# Patient Record
Sex: MALE | Race: WHITE | NOT HISPANIC OR LATINO
[De-identification: names, ages, dates, MRNs, and addresses within clinical notes are randomized per-mention and may not be internally consistent; named-entity substitution may affect disease eponyms.]

---

## 2017-07-10 ENCOUNTER — OTHER (OUTPATIENT)
Age: 62
End: 2017-07-10

## 2017-07-20 ENCOUNTER — LABORATORY RESULT (OUTPATIENT)
Age: 62
End: 2017-07-20

## 2017-07-20 LAB
ALBUMIN SERPL ELPH-MCNC: 4.3 G/DL
ALP BLD-CCNC: 72 U/L
ALT SERPL-CCNC: 31 U/L
ANION GAP SERPL CALC-SCNC: 15 MMOL/L
APPEARANCE: CLEAR
AST SERPL-CCNC: 20 U/L
BACTERIA: NEGATIVE
BASOPHILS # BLD AUTO: 0 K/UL
BASOPHILS NFR BLD AUTO: 0 %
BILIRUB SERPL-MCNC: 0.4 MG/DL
BILIRUBIN URINE: NEGATIVE
BLOOD URINE: NEGATIVE
BUN SERPL-MCNC: 23 MG/DL
CALCIUM SERPL-MCNC: 9.5 MG/DL
CHLORIDE SERPL-SCNC: 103 MMOL/L
CHOLEST SERPL-MCNC: 180 MG/DL
CHOLEST/HDLC SERPL: 5.3 RATIO
CO2 SERPL-SCNC: 23 MMOL/L
COLOR: YELLOW
CREAT SERPL-MCNC: 0.9 MG/DL
EOSINOPHIL # BLD AUTO: 0.07 K/UL
EOSINOPHIL NFR BLD AUTO: 0.9 %
FOLATE SERPL-MCNC: 7.2 NG/ML
GGT SERPL-CCNC: 24 U/L
GLUCOSE QUALITATIVE U: NORMAL MG/DL
GLUCOSE SERPL-MCNC: 110 MG/DL
HBA1C MFR BLD HPLC: 5.4 %
HCT VFR BLD CALC: 37.8 %
HDLC SERPL-MCNC: 34 MG/DL
HGB BLD-MCNC: 11.8 G/DL
IRON SERPL-MCNC: 62 UG/DL
KETONES URINE: NEGATIVE
LDLC SERPL CALC-MCNC: 100 MG/DL
LEUKOCYTE ESTERASE URINE: NEGATIVE
LYMPHOCYTES # BLD AUTO: 1.1 K/UL
LYMPHOCYTES NFR BLD AUTO: 14.8 %
MAN DIFF?: NORMAL
MCHC RBC-ENTMCNC: 21.9 PG
MCHC RBC-ENTMCNC: 31.2 GM/DL
MCV RBC AUTO: 70.3 FL
MICROSCOPIC-UA: NORMAL
MONOCYTES # BLD AUTO: 0.19 K/UL
MONOCYTES NFR BLD AUTO: 2.6 %
NEUTROPHILS # BLD AUTO: 5.86 K/UL
NEUTROPHILS NFR BLD AUTO: 79.1 %
NITRITE URINE: NEGATIVE
PH URINE: 5.5
PLATELET # BLD AUTO: 200 K/UL
POTASSIUM SERPL-SCNC: 4.4 MMOL/L
PROT SERPL-MCNC: 7.4 G/DL
PROTEIN URINE: NEGATIVE MG/DL
PSA SERPL-MCNC: 0.42 NG/ML
RBC # BLD: 5.38 M/UL
RBC # FLD: 15.9 %
RED BLOOD CELLS URINE: 3 /HPF
SODIUM SERPL-SCNC: 141 MMOL/L
SPECIFIC GRAVITY URINE: 1.03
SQUAMOUS EPITHELIAL CELLS: 0 /HPF
TRIGL SERPL-MCNC: 228 MG/DL
TSH SERPL-ACNC: 2.7 UIU/ML
UROBILINOGEN URINE: NORMAL MG/DL
VIT B12 SERPL-MCNC: 349 PG/ML
WBC # FLD AUTO: 7.41 K/UL
WHITE BLOOD CELLS URINE: 0 /HPF

## 2017-07-21 LAB — 25(OH)D3 SERPL-MCNC: 32.4 NG/ML

## 2017-07-24 ENCOUNTER — APPOINTMENT (OUTPATIENT)
Dept: INTERNAL MEDICINE | Facility: CLINIC | Age: 62
End: 2017-07-24

## 2017-07-24 ENCOUNTER — NON-APPOINTMENT (OUTPATIENT)
Age: 62
End: 2017-07-24

## 2017-07-24 VITALS
WEIGHT: 231 LBS | SYSTOLIC BLOOD PRESSURE: 132 MMHG | TEMPERATURE: 98.1 F | HEART RATE: 98 BPM | BODY MASS INDEX: 30.62 KG/M2 | HEIGHT: 73 IN | RESPIRATION RATE: 16 BRPM | DIASTOLIC BLOOD PRESSURE: 80 MMHG | OXYGEN SATURATION: 97 %

## 2017-10-23 ENCOUNTER — APPOINTMENT (OUTPATIENT)
Dept: CT IMAGING | Facility: CLINIC | Age: 62
End: 2017-10-23

## 2017-11-01 ENCOUNTER — APPOINTMENT (OUTPATIENT)
Dept: INTERNAL MEDICINE | Facility: CLINIC | Age: 62
End: 2017-11-01
Payer: COMMERCIAL

## 2017-11-01 VITALS
OXYGEN SATURATION: 97 % | TEMPERATURE: 97.5 F | RESPIRATION RATE: 16 BRPM | BODY MASS INDEX: 30.48 KG/M2 | SYSTOLIC BLOOD PRESSURE: 130 MMHG | DIASTOLIC BLOOD PRESSURE: 74 MMHG | HEART RATE: 87 BPM | HEIGHT: 73 IN | WEIGHT: 230 LBS

## 2017-11-01 PROCEDURE — 99214 OFFICE O/P EST MOD 30 MIN: CPT

## 2017-12-07 ENCOUNTER — APPOINTMENT (OUTPATIENT)
Dept: INTERNAL MEDICINE | Facility: CLINIC | Age: 62
End: 2017-12-07
Payer: COMMERCIAL

## 2017-12-07 VITALS
DIASTOLIC BLOOD PRESSURE: 62 MMHG | BODY MASS INDEX: 30.09 KG/M2 | OXYGEN SATURATION: 98 % | TEMPERATURE: 97.8 F | SYSTOLIC BLOOD PRESSURE: 110 MMHG | HEIGHT: 73 IN | RESPIRATION RATE: 18 BRPM | WEIGHT: 227 LBS | HEART RATE: 90 BPM

## 2017-12-07 DIAGNOSIS — H26.9 UNSPECIFIED CATARACT: ICD-10-CM

## 2017-12-07 PROCEDURE — 99214 OFFICE O/P EST MOD 30 MIN: CPT

## 2018-07-30 ENCOUNTER — APPOINTMENT (OUTPATIENT)
Dept: INTERNAL MEDICINE | Facility: CLINIC | Age: 63
End: 2018-07-30

## 2018-10-28 ENCOUNTER — RESULT CHARGE (OUTPATIENT)
Age: 63
End: 2018-10-28

## 2018-10-28 ENCOUNTER — EMERGENCY (EMERGENCY)
Facility: HOSPITAL | Age: 63
LOS: 0 days | Discharge: HOME | End: 2018-10-28
Attending: EMERGENCY MEDICINE | Admitting: EMERGENCY MEDICINE
Payer: COMMERCIAL

## 2018-10-28 VITALS
HEART RATE: 97 BPM | SYSTOLIC BLOOD PRESSURE: 143 MMHG | DIASTOLIC BLOOD PRESSURE: 71 MMHG | OXYGEN SATURATION: 98 % | RESPIRATION RATE: 20 BRPM | TEMPERATURE: 99 F

## 2018-10-28 VITALS — WEIGHT: 240.08 LBS | HEIGHT: 73 IN

## 2018-10-28 DIAGNOSIS — F17.200 NICOTINE DEPENDENCE, UNSPECIFIED, UNCOMPLICATED: ICD-10-CM

## 2018-10-28 DIAGNOSIS — D56.9 THALASSEMIA, UNSPECIFIED: ICD-10-CM

## 2018-10-28 DIAGNOSIS — J18.9 PNEUMONIA, UNSPECIFIED ORGANISM: ICD-10-CM

## 2018-10-28 DIAGNOSIS — R05 COUGH: ICD-10-CM

## 2018-10-28 DIAGNOSIS — R06.02 SHORTNESS OF BREATH: ICD-10-CM

## 2018-10-28 DIAGNOSIS — R07.9 CHEST PAIN, UNSPECIFIED: ICD-10-CM

## 2018-10-28 LAB
ALBUMIN SERPL ELPH-MCNC: 3.7 G/DL — SIGNIFICANT CHANGE UP (ref 3.3–5)
ALP SERPL-CCNC: 62 U/L — SIGNIFICANT CHANGE UP (ref 40–120)
ALT FLD-CCNC: 32 U/L — SIGNIFICANT CHANGE UP (ref 12–78)
ANION GAP SERPL CALC-SCNC: 7 MMOL/L — SIGNIFICANT CHANGE UP (ref 5–17)
APTT BLD: 30.3 SEC — SIGNIFICANT CHANGE UP (ref 27.5–37.4)
AST SERPL-CCNC: 14 U/L — LOW (ref 15–37)
BILIRUB SERPL-MCNC: 0.3 MG/DL — SIGNIFICANT CHANGE UP (ref 0.2–1.2)
BUN SERPL-MCNC: 14 MG/DL — SIGNIFICANT CHANGE UP (ref 7–23)
CALCIUM SERPL-MCNC: 8.5 MG/DL — SIGNIFICANT CHANGE UP (ref 8.5–10.1)
CHLORIDE SERPL-SCNC: 106 MMOL/L — SIGNIFICANT CHANGE UP (ref 96–108)
CO2 SERPL-SCNC: 27 MMOL/L — SIGNIFICANT CHANGE UP (ref 22–31)
CREAT SERPL-MCNC: 0.83 MG/DL — SIGNIFICANT CHANGE UP (ref 0.5–1.3)
GLUCOSE SERPL-MCNC: 92 MG/DL — SIGNIFICANT CHANGE UP (ref 70–99)
HCT VFR BLD CALC: 35 % — LOW (ref 39–50)
HGB BLD-MCNC: 10.8 G/DL — LOW (ref 13–17)
INR BLD: 1.24 RATIO — HIGH (ref 0.88–1.16)
MAGNESIUM SERPL-MCNC: 1.9 MG/DL — SIGNIFICANT CHANGE UP (ref 1.6–2.6)
MCHC RBC-ENTMCNC: 22 PG — LOW (ref 27–34)
MCHC RBC-ENTMCNC: 30.9 GM/DL — LOW (ref 32–36)
MCV RBC AUTO: 71.1 FL — LOW (ref 80–100)
NRBC # BLD: 0 /100 WBCS — SIGNIFICANT CHANGE UP (ref 0–0)
NT-PROBNP SERPL-SCNC: 77 PG/ML — SIGNIFICANT CHANGE UP (ref 0–125)
PLATELET # BLD AUTO: 146 K/UL — LOW (ref 150–400)
POTASSIUM SERPL-MCNC: 3.7 MMOL/L — SIGNIFICANT CHANGE UP (ref 3.5–5.3)
POTASSIUM SERPL-SCNC: 3.7 MMOL/L — SIGNIFICANT CHANGE UP (ref 3.5–5.3)
PROT SERPL-MCNC: 7.6 GM/DL — SIGNIFICANT CHANGE UP (ref 6–8.3)
PROTHROM AB SERPL-ACNC: 13.5 SEC — HIGH (ref 9.8–12.7)
RBC # BLD: 4.92 M/UL — SIGNIFICANT CHANGE UP (ref 4.2–5.8)
RBC # FLD: 15.4 % — HIGH (ref 10.3–14.5)
SODIUM SERPL-SCNC: 140 MMOL/L — SIGNIFICANT CHANGE UP (ref 135–145)
TROPONIN I SERPL-MCNC: <0.015 NG/ML — SIGNIFICANT CHANGE UP (ref 0.01–0.04)
TROPONIN I SERPL-MCNC: <0.015 NG/ML — SIGNIFICANT CHANGE UP (ref 0.01–0.04)
WBC # BLD: 8.02 K/UL — SIGNIFICANT CHANGE UP (ref 3.8–10.5)
WBC # FLD AUTO: 8.02 K/UL — SIGNIFICANT CHANGE UP (ref 3.8–10.5)

## 2018-10-28 PROCEDURE — 93010 ELECTROCARDIOGRAM REPORT: CPT

## 2018-10-28 PROCEDURE — 99284 EMERGENCY DEPT VISIT MOD MDM: CPT

## 2018-10-28 PROCEDURE — 71045 X-RAY EXAM CHEST 1 VIEW: CPT | Mod: 26

## 2018-10-28 RX ORDER — AZITHROMYCIN 500 MG/1
250 TABLET, FILM COATED ORAL
Qty: 1000 | Refills: 0
Start: 2018-10-28 | End: 2018-10-31

## 2018-10-28 RX ORDER — AZITHROMYCIN 500 MG/1
500 TABLET, FILM COATED ORAL ONCE
Qty: 0 | Refills: 0 | Status: COMPLETED | OUTPATIENT
Start: 2018-10-28 | End: 2018-10-28

## 2018-10-28 RX ORDER — ALBUTEROL 90 UG/1
2 AEROSOL, METERED ORAL
Qty: 1 | Refills: 0 | OUTPATIENT
Start: 2018-10-28 | End: 2018-10-30

## 2018-10-28 RX ORDER — ASPIRIN/CALCIUM CARB/MAGNESIUM 324 MG
325 TABLET ORAL ONCE
Qty: 0 | Refills: 0 | Status: COMPLETED | OUTPATIENT
Start: 2018-10-28 | End: 2018-10-28

## 2018-10-28 RX ORDER — IPRATROPIUM/ALBUTEROL SULFATE 18-103MCG
3 AEROSOL WITH ADAPTER (GRAM) INHALATION ONCE
Qty: 0 | Refills: 0 | Status: COMPLETED | OUTPATIENT
Start: 2018-10-28 | End: 2018-10-28

## 2018-10-28 RX ORDER — HYDRALAZINE HCL 50 MG
100 TABLET ORAL ONCE
Qty: 0 | Refills: 0 | Status: DISCONTINUED | OUTPATIENT
Start: 2018-10-28 | End: 2018-10-28

## 2018-10-28 RX ORDER — ALBUTEROL 90 UG/1
2.5 AEROSOL, METERED ORAL ONCE
Qty: 0 | Refills: 0 | Status: COMPLETED | OUTPATIENT
Start: 2018-10-28 | End: 2018-10-28

## 2018-10-28 RX ADMIN — AZITHROMYCIN 500 MILLIGRAM(S): 500 TABLET, FILM COATED ORAL at 21:50

## 2018-10-28 RX ADMIN — Medication 125 MILLIGRAM(S): at 18:27

## 2018-10-28 RX ADMIN — ALBUTEROL 2.5 MILLIGRAM(S): 90 AEROSOL, METERED ORAL at 18:28

## 2018-10-28 RX ADMIN — Medication 325 MILLIGRAM(S): at 17:58

## 2018-10-28 RX ADMIN — Medication 3 MILLILITER(S): at 17:58

## 2018-10-28 NOTE — ED PROVIDER NOTE - NS_ ATTENDINGSCRIBEDETAILS _ED_A_ED_FT
Waqas Cole DO (Attending): The history, relevant review of systems, past medical and surgical history, medical decision making, and physical examination was documented by the scribe in my presence and I attest to the accuracy of the documentation.

## 2018-10-28 NOTE — ED PROVIDER NOTE - ENMT, MLM
Airway patent, Mouth with normal mucosa. Throat has no vesicles, no oropharyngeal exudates and uvula is midline. +erythematous oropharynx with post nasal drip.

## 2018-10-28 NOTE — ED PROVIDER NOTE - OBJECTIVE STATEMENT
62 y/o male with a PMHx of Mediterranean anemia with thalassemia presents to the ED c/o SOB, productive cough since last night. Productive cough is with clear sputum. +nasal congestion. 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 This morning pt's SOB was improved, but then worsened again this afternoon and associated with chest heaviness, neck tightness. At time of exam, pt reports neck discomfort. No vomiting, diarrhea, lightheadedness, dizziness, difficulty walking. No daily medications. 1.5ppd smoker. Pt taking inhalers without relief. Allergic to Augmentin and Latex. Pulm: Dr. Matthew.

## 2018-10-28 NOTE — ED ADULT TRIAGE NOTE - CHIEF COMPLAINT QUOTE
pt presents to ED c/o chest pressure worsening since last night, states "It feels like an elephant is sitting on my chest". pt denies cardiac hx.

## 2018-10-28 NOTE — ED STATDOCS - PROGRESS NOTE DETAILS
Lupillo March for attending Dr. Nevarez: 62 y/o m with no PMHx presenting to the ED c/o cough, chest pressure worsening since last night. States pressure worse with exertion. Cough productive of clear sputum. Denies cardiac hx. Tylenol taken today at 2pm and last night. 1.5 PPD cigarette smoker x15 years. PCP: , Cardio: Dr. Glez. Will send pt to main ED for further evaluation.

## 2018-10-28 NOTE — ED PROVIDER NOTE - ATTENDING CONTRIBUTION TO CARE
I, Waqas Cole DO,  performed the initial face to face bedside interview with this patient regarding history of present illness, review of symptoms and relevant past medical, social and family history.  I completed an independent physical examination.  I was the initial provider who evaluated this patient. I have signed out the follow up of any pending tests (i.e. labs, radiological studies) to the ACP.  I have communicated the patient’s plan of care and disposition with the ACP.

## 2018-10-28 NOTE — ED PROVIDER NOTE - MEDICAL DECISION MAKING DETAILS
Pt with cough, and difficulty breathing with chest pressure. Sensation of wheezing reported by pt. Exam currently benign. Concern for primary respiratory vs. ACS (lower concern). Plan for labs, imaging, neb treatment and reassess.

## 2018-10-28 NOTE — ED PROVIDER NOTE - PROGRESS NOTE DETAILS
64 y/o M presents with SOB. Pt states last night he started having a cough productive of clear sputum and nasal congestion. Pt reports chest pressure mild in intensity associated with icnreasing SOB. Pt reports feeling warm and having chills. Smokes 1.5ppd.  Denies N/V/D, palpitations, abd pain HA, dizziness or other complaints at this time. -Abraham Sotelo PA-C Results reviewed and discussed with pt. Pt reports improvement of sx with medications provided. Will tx with abx to treat possible early pna. Discussed importance of close FU with PMD.  Pt asked to return to ED immediately for any new or concerning sx or worsening sx. Pt acknowledges and understands plan. -Abraham Sotelo PA-C

## 2018-10-29 ENCOUNTER — NON-APPOINTMENT (OUTPATIENT)
Age: 63
End: 2018-10-29

## 2018-10-29 ENCOUNTER — APPOINTMENT (OUTPATIENT)
Dept: INTERNAL MEDICINE | Facility: CLINIC | Age: 63
End: 2018-10-29
Payer: COMMERCIAL

## 2018-10-29 VITALS
BODY MASS INDEX: 30.62 KG/M2 | HEIGHT: 73 IN | HEART RATE: 102 BPM | WEIGHT: 231 LBS | RESPIRATION RATE: 18 BRPM | OXYGEN SATURATION: 97 % | TEMPERATURE: 98.2 F | DIASTOLIC BLOOD PRESSURE: 80 MMHG | SYSTOLIC BLOOD PRESSURE: 134 MMHG

## 2018-10-29 DIAGNOSIS — Z86.2 PERSONAL HISTORY OF DISEASES OF THE BLOOD AND BLOOD-FORMING ORGANS AND CERTAIN DISORDERS INVOLVING THE IMMUNE MECHANISM: ICD-10-CM

## 2018-10-29 PROCEDURE — 99496 TRANSJ CARE MGMT HIGH F2F 7D: CPT | Mod: 25

## 2018-10-29 PROCEDURE — 99213 OFFICE O/P EST LOW 20 MIN: CPT | Mod: 25

## 2018-10-29 PROCEDURE — 94060 EVALUATION OF WHEEZING: CPT

## 2018-10-29 NOTE — REVIEW OF SYSTEMS
[Chills] : chills [Fatigue] : fatigue [Chest Pain] : chest pain [Shortness Of Breath] : shortness of breath [Cough] : cough [Dyspnea on Exertion] : dyspnea on exertion [Negative] : Heme/Lymph [Fever] : no fever [Night Sweats] : no night sweats [Postnasal Drip] : no postnasal drip [Nasal Discharge] : no nasal discharge [Palpitations] : no palpitations [Claudication] : no  leg claudication [Lower Ext Edema] : no lower extremity edema [Orthopena] : no orthopnea [Skin Rash] : no skin rash

## 2018-10-29 NOTE — PHYSICAL EXAM
[No Acute Distress] : no acute distress [Well Nourished] : well nourished [Normal Sclera/Conjunctiva] : normal sclera/conjunctiva [Normal Outer Ear/Nose] : the outer ears and nose were normal in appearance [Normal TMs] : both tympanic membranes were normal [No JVD] : no jugular venous distention [Supple] : supple [No Respiratory Distress] : no respiratory distress  [Clear to Auscultation] : lungs were clear to auscultation bilaterally [No Accessory Muscle Use] : no accessory muscle use [Normal Rate] : normal rate  [Regular Rhythm] : with a regular rhythm [Normal S1, S2] : normal S1 and S2 [No Edema] : there was no peripheral edema [Soft] : abdomen soft [Non Tender] : non-tender [Normal Supraclavicular Nodes] : no supraclavicular lymphadenopathy [Normal Posterior Cervical Nodes] : no posterior cervical lymphadenopathy [Normal Anterior Cervical Nodes] : no anterior cervical lymphadenopathy [No Joint Swelling] : no joint swelling [Grossly Normal Strength/Tone] : grossly normal strength/tone [No Rash] : no rash [No Focal Deficits] : no focal deficits [Normal Affect] : the affect was normal [Normal Insight/Judgement] : insight and judgment were intact [de-identified] : Oropharynx mildly erythematous without exudates. [de-identified] : Spirmetry shows restriction without improvement post bronchodilator.

## 2018-10-29 NOTE — ASSESSMENT
[FreeTextEntry1] : S/p ED visit for SOB, cough and chest pain.  Negative for Cardiac cause, likelly triggered by environmental toxic exposure the morning before admission. 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

## 2018-10-29 NOTE — HISTORY OF PRESENT ILLNESS
[FreeTextEntry2] : Presented to ED yesterday with complaint of SOB and cough, accompanied by nasal congestion, chest heaviness and neck tightness.  Was taking inhalers (his wife's ProAir) without relief.  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 Interval visit to 9/11 clinic last month was "OK."\par Chest x-ray and EKG at ED was noncontributory, IV steroids and respiratory treatments were completed.  Blood work showed Thalassemia (known) and Cardiac enzymes were negative. Discharged with a course of Prednisone and Azithromycin for presumed early pneumonia.

## 2018-11-09 PROBLEM — D56.9 THALASSEMIA, UNSPECIFIED: Chronic | Status: INACTIVE | Noted: 2018-10-28 | Resolved: 2018-11-08

## 2018-11-12 ENCOUNTER — APPOINTMENT (OUTPATIENT)
Dept: DERMATOLOGY | Facility: CLINIC | Age: 63
End: 2018-11-12
Payer: COMMERCIAL

## 2018-11-12 VITALS — WEIGHT: 240 LBS | HEIGHT: 73 IN | BODY MASS INDEX: 31.81 KG/M2

## 2018-11-12 DIAGNOSIS — D22.9 MELANOCYTIC NEVI, UNSPECIFIED: ICD-10-CM

## 2018-11-12 DIAGNOSIS — L98.8 OTHER SPECIFIED DISORDERS OF THE SKIN AND SUBCUTANEOUS TISSUE: ICD-10-CM

## 2018-11-12 DIAGNOSIS — D48.9 NEOPLASM OF UNCERTAIN BEHAVIOR, UNSPECIFIED: ICD-10-CM

## 2018-11-12 PROCEDURE — 99243 OFF/OP CNSLTJ NEW/EST LOW 30: CPT | Mod: 25

## 2018-11-12 PROCEDURE — 17110 DESTRUCTION B9 LES UP TO 14: CPT

## 2018-11-12 PROCEDURE — 11100 BX SKIN SUBCUTANEOUS&/MUCOUS MEMBRANE 1 LESION: CPT | Mod: 59

## 2018-11-12 RX ORDER — AZITHROMYCIN 250 MG/1
250 TABLET, FILM COATED ORAL
Qty: 1 | Refills: 0 | Status: DISCONTINUED | COMMUNITY
Start: 2018-10-29 | End: 2018-11-12

## 2018-11-12 RX ORDER — PREDNISONE 10 MG/1
10 TABLET ORAL
Qty: 5 | Refills: 0 | Status: DISCONTINUED | COMMUNITY
Start: 2018-10-29 | End: 2018-11-12

## 2018-11-14 ENCOUNTER — MOBILE ON CALL (OUTPATIENT)
Age: 63
End: 2018-11-14

## 2018-11-16 LAB — CORE LAB BIOPSY: NORMAL

## 2019-01-24 ENCOUNTER — APPOINTMENT (OUTPATIENT)
Dept: INTERNAL MEDICINE | Facility: CLINIC | Age: 64
End: 2019-01-24

## 2019-01-24 PROBLEM — D56.9 THALASSEMIA, UNSPECIFIED: Chronic | Status: ACTIVE | Noted: 2018-11-08

## 2019-01-28 ENCOUNTER — APPOINTMENT (OUTPATIENT)
Dept: INTERNAL MEDICINE | Facility: CLINIC | Age: 64
End: 2019-01-28
Payer: COMMERCIAL

## 2019-01-28 ENCOUNTER — NON-APPOINTMENT (OUTPATIENT)
Age: 64
End: 2019-01-28

## 2019-01-28 VITALS
HEIGHT: 73 IN | BODY MASS INDEX: 31.14 KG/M2 | DIASTOLIC BLOOD PRESSURE: 70 MMHG | SYSTOLIC BLOOD PRESSURE: 138 MMHG | OXYGEN SATURATION: 96 % | RESPIRATION RATE: 20 BRPM | HEART RATE: 86 BPM | WEIGHT: 235 LBS | TEMPERATURE: 98.6 F

## 2019-01-28 PROCEDURE — 94060 EVALUATION OF WHEEZING: CPT

## 2019-01-28 PROCEDURE — 99213 OFFICE O/P EST LOW 20 MIN: CPT | Mod: 25

## 2019-01-28 RX ORDER — TROPICAMIDE 10 MG/ML
1 SOLUTION/ DROPS OPHTHALMIC
Qty: 15 | Refills: 0 | Status: DISCONTINUED | COMMUNITY
Start: 2017-10-26 | End: 2019-01-28

## 2019-01-28 RX ORDER — OFLOXACIN 3 MG/ML
0.3 SOLUTION/ DROPS OPHTHALMIC
Qty: 5 | Refills: 0 | Status: DISCONTINUED | COMMUNITY
Start: 2017-10-26 | End: 2019-01-28

## 2019-01-28 RX ORDER — DIFLUPREDNATE 0.5 MG/ML
0.05 EMULSION OPHTHALMIC
Qty: 5 | Refills: 0 | Status: DISCONTINUED | COMMUNITY
Start: 2017-10-26 | End: 2019-01-28

## 2019-01-28 RX ORDER — BROMFENAC SODIUM 0.7 MG/ML
0.07 SOLUTION/ DROPS OPHTHALMIC
Qty: 3 | Refills: 0 | Status: DISCONTINUED | COMMUNITY
Start: 2017-10-26 | End: 2019-01-28

## 2019-01-28 NOTE — PLAN
[FreeTextEntry1] : Discussed different inhalers, mechanism of action and rationale for use, voices understanding.\par Agreeable to starting steroid inhaler, Flovent 110 MCG/act 1 puff BID.\par Advised under no uncertain terms to quit smoking, "he is working toward that goal."\par Patient due for annual PE with PFT.\par Advised to call sooner if no resolve of exacerbation, to ED for emergent symptoms.\par

## 2019-01-28 NOTE — PHYSICAL EXAM
[No Acute Distress] : no acute distress [Well Nourished] : well nourished [Normal Sclera/Conjunctiva] : normal sclera/conjunctiva [Normal Oropharynx] : the oropharynx was normal [Normal TMs] : both tympanic membranes were normal [Supple] : supple [No Respiratory Distress] : no respiratory distress  [Clear to Auscultation] : lungs were clear to auscultation bilaterally [Normal Rate] : normal rate  [Regular Rhythm] : with a regular rhythm [No Edema] : there was no peripheral edema [Normal Posterior Cervical Nodes] : no posterior cervical lymphadenopathy [Normal Anterior Cervical Nodes] : no anterior cervical lymphadenopathy [No Rash] : no rash [No Focal Deficits] : no focal deficits [Normal Affect] : the affect was normal

## 2019-01-28 NOTE — HISTORY OF PRESENT ILLNESS
[FreeTextEntry8] : Complains of increased SOB and wheeze especially in evening over the past 2 weeks.  \deb Has been using Albuterol MDI  every other day this week, does provide transient relief.\deb Denies cough, sputum production chest pains or fevers.\deb Has been more careful to use his respirator at job sites where there are fumes and dust.\deb Unfortunately continues to smoke cigarettes and is arranging to start a smoking cessation program at [facility] with his wife.

## 2019-01-28 NOTE — REVIEW OF SYSTEMS
[Shortness Of Breath] : shortness of breath [Wheezing] : wheezing [Negative] : Neurological [Cough] : no cough

## 2019-01-30 DIAGNOSIS — Z78.9 OTHER SPECIFIED HEALTH STATUS: ICD-10-CM

## 2019-02-08 PROBLEM — Z78.9 SOCIAL ALCOHOL USE: Status: ACTIVE | Noted: 2019-02-08

## 2019-02-21 ENCOUNTER — APPOINTMENT (OUTPATIENT)
Dept: ORTHOPEDIC SURGERY | Facility: CLINIC | Age: 64
End: 2019-02-21

## 2019-04-11 ENCOUNTER — RX RENEWAL (OUTPATIENT)
Age: 64
End: 2019-04-11

## 2019-04-11 RX ORDER — ALBUTEROL SULFATE 90 UG/1
108 (90 BASE) AEROSOL, METERED RESPIRATORY (INHALATION)
Qty: 1 | Refills: 2 | Status: ACTIVE | COMMUNITY
Start: 2019-04-11 | End: 1900-01-01

## 2019-04-18 ENCOUNTER — MEDICATION RENEWAL (OUTPATIENT)
Age: 64
End: 2019-04-18

## 2019-09-09 ENCOUNTER — EMERGENCY (EMERGENCY)
Facility: HOSPITAL | Age: 64
LOS: 0 days | Discharge: HOME | End: 2019-09-09
Attending: EMERGENCY MEDICINE
Payer: COMMERCIAL

## 2019-09-09 VITALS
RESPIRATION RATE: 15 BRPM | HEART RATE: 98 BPM | SYSTOLIC BLOOD PRESSURE: 144 MMHG | TEMPERATURE: 99 F | OXYGEN SATURATION: 98 % | DIASTOLIC BLOOD PRESSURE: 80 MMHG

## 2019-09-09 VITALS — WEIGHT: 175.05 LBS | HEIGHT: 66 IN

## 2019-09-09 DIAGNOSIS — Z88.1 ALLERGY STATUS TO OTHER ANTIBIOTIC AGENTS STATUS: ICD-10-CM

## 2019-09-09 DIAGNOSIS — R51 HEADACHE: ICD-10-CM

## 2019-09-09 DIAGNOSIS — S06.0X0A CONCUSSION WITHOUT LOSS OF CONSCIOUSNESS, INITIAL ENCOUNTER: ICD-10-CM
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
## 2019-09-09 DIAGNOSIS — D56.9 THALASSEMIA, UNSPECIFIED: ICD-10-CM

## 2019-09-09 PROCEDURE — 99284 EMERGENCY DEPT VISIT MOD MDM: CPT

## 2019-09-09 PROCEDURE — 70450 CT HEAD/BRAIN W/O DYE: CPT

## 2019-09-09 PROCEDURE — 99284 EMERGENCY DEPT VISIT MOD MDM: CPT | Mod: 25

## 2019-09-09 PROCEDURE — 70450 CT HEAD/BRAIN W/O DYE: CPT | Mod: 26

## 2019-09-09 RX ORDER — ONDANSETRON 8 MG/1
1 TABLET, FILM COATED ORAL
Qty: 20 | Refills: 0
Start: 2019-09-09

## 2019-09-09 RX ORDER — ACETAMINOPHEN 500 MG
650 TABLET ORAL ONCE
Refills: 0 | Status: COMPLETED | OUTPATIENT
Start: 2019-09-09 | End: 2019-09-09

## 2019-09-09 RX ADMIN — Medication 650 MILLIGRAM(S): at 19:06

## 2019-09-09 NOTE — ED STATDOCS - NSFOLLOWUPINSTRUCTIONS_ED_ALL_ED_FT
Concussion    WHAT YOU NEED TO KNOW:    A concussion is a mild brain injury. It is usually caused by a bump or blow to the head from a fall, a motor vehicle crash, or a sports injury. Sometimes being shaken forcefully may cause a concussion.    DISCHARGE INSTRUCTIONS:    Have someone call 911 for any of the following:     Someone tries to wake you and cannot do so.      You have a seizure, increasing confusion, or a change in personality.      Your speech becomes slurred, or you have new vision problems.    Return to the emergency department if:     You have sudden and new vision problems.      You have a severe headache that does not go away.      You have arm or leg weakness, numbness, or new problems with coordination.      You have blood or clear fluid coming out of the ears or nose.    Contact your healthcare provider if:     You have nausea or are vomiting.      You feel more sleepy than usual.      Your symptoms get worse.      Your symptoms last longer than 6 weeks after the injury.      You have questions or concerns about your condition or care.    Medicines: You may need any of the following:     Acetaminophen decreases pain and fever. It is available without a doctor's order. Ask how much to take and how often to take it. Follow directions. Read the labels of all other medicines you are using to see if they also contain acetaminophen, or ask your doctor or pharmacist. Acetaminophen can cause liver damage if not taken correctly. Do not use more than 4 grams (4,000 milligrams) total of acetaminophen in one day.       NSAIDs help decrease swelling and pain or fever. This medicine is available with or without a doctor's order. NSAIDs can cause stomach bleeding or kidney problems in certain people. If you take blood thinner medicine, always ask your healthcare provider if NSAIDs are safe for you. Always read the medicine label and follow directions.      Take your medicine as directed. Contact your healthcare provider if you think your medicine is not helping or if you have side effects. Tell him or her if you are allergic to any medicine. Keep a list of the medicines, vitamins, and herbs you take. Include the amounts, and when and why you take them. Bring the list or the pill bottles to follow-up visits. Carry your medicine list with you in case of an emergency.    Self-care: Concussion symptoms usually go away within about 10 days, but they may last longer. The following may be recommended to manage your symptoms:     Rest from physical and mental activities as directed. Mental activities are those that require thinking, concentration, and attention. You will need to rest until your symptoms are gone. Rest will allow you to recover from your concussion. Ask your healthcare provider when you can return to work and other daily activities.      Have someone stay with you for the first 24 hours after your injury. Your healthcare provider should be contacted if your symptoms get worse, or you develop new symptoms.      Do not participate in sports and physical activities until your healthcare provider says it is okay. They could make your symptoms worse or lead to another concussion. Your healthcare provider will tell you when it is okay for you to return to sports or physical activities. Ask for more information about sports concussions.    Prevent another concussion:     Wear protective sports equipment that fits properly. Helmets help decrease your risk for a serious brain injury. Talk to your healthcare provider about ways you can decrease your risk for a concussion if you play sports.      Wear your seatbelt every time you travel. This helps to decrease your risk for a head injury if you are in a car accident.     Follow up with your healthcare provider as directed: Write down your questions so you remember to ask them during your visits.     FOLLOW UP EVALUATION  To ensure optimal concussion recovery, follow up with a doctor specialized in concussion management. An evaluation by a specialty concussion program can ensure timely return to activities.    We offer appointments through the [facility]’s Hotline at [phone].

## 2019-09-09 NOTE — ED STATDOCS - PATIENT PORTAL LINK FT
You can access the FollowMyHealth Patient Portal offered by [facility] by registering at the following website: [email or web address]. By joining [employer]’s FollowMyHealth portal, you will also be able to view your health information using other applications (apps) compatible with our system.

## 2019-09-09 NOTE — ED STATDOCS - NEUROLOGICAL, MLM
Medication:    Outpatient Medications Marked as Taking for the 8/13/19 encounter (Refill) with Ashli Gooden MD   Medication Sig Dispense Refill   • escitalopram (LEXAPRO) 20 MG tablet Take 1 tablet by mouth daily. 30 tablet 3   • atomoxetine (STRATTERA) 60 MG capsule Take 1 capsule by mouth daily. Indications: Attention Deficit Hyperactivity Disorder 30 capsule 3   • ARIPiprazole (ABILIFY) 2 MG tablet Take 1 tablet by mouth daily. Indications: Autism, Depression 30 tablet 3       Message to Prescriber:     [x] Pharmacy has been verified.    [] Patient completely out of medication (*Route encounter as high priority if checked)    [x] Patient informed refill request can take up to 3 business days to be processed    Patient currently has follow up appointment scheduled       sensation is normal and strength is normal.

## 2019-09-09 NOTE — ED ADULT TRIAGE NOTE - CHIEF COMPLAINT QUOTE
Patient presents stating he fell off ladder on Saturday from approximately 8-9 feet. Landing on left side. Patient reports he hit his head, denies LOC, denies blood thinners. Patient reports headache dizziness, left sided pain since fall

## 2019-09-09 NOTE — ED STATDOCS - PROGRESS NOTE DETAILS
65 y/o M with PMH of anemia presents s/p fall from ladder 3 days ago with HA, nausea/vomiting and left leg pain. Pt states he was climbing up ladder when it broke into 2 pieces. +head trauma, no LOC. No anticoagulants. PE 63 y/o M with PMH of anemia presents s/p fall from ladder 3 days ago with HA, nausea/vomiting and left leg pain. Pt states he was climbing up ladder when it broke into 2 pieces. +head trauma, no LOC. No anticoagulants. PE: Well appearing. Neuro: CN II-XII intact. Sensation intact to light touch in all extremities. 5/5 upper and lower extremity strength. Cardiac: s1s2, RRR. Lungs: CTAB. Abdomen: NBS x4, soft, nontender. MSK: No obvious deformity to extremities. +TTP left lateral thigh. Full ROM in lower extremity joints. Ambulatory without difficulty. A/P: likely concussion. Plan for head CT and reassess. - Henry Pelaez PA-C

## 2019-09-09 NOTE — ED STATDOCS - CLINICAL SUMMARY MEDICAL DECISION MAKING FREE TEXT BOX
Pt with fall from ladder x2 days ago with intermittent nausea, HA, likely post-concussive syndrome, will CT head, r/o bleed. Pt with fall from ladder x2 days ago with intermittent nausea, HA, likely post-concussive syndrome, will CT head, r/o bleed. CT negative. will d/c and refer to concussion clinic.

## 2019-09-09 NOTE — ED STATDOCS - OBJECTIVE STATEMENT
63 y/o m with PMHx of mediterranean anemia presenting to the ED c/o HA, n/v, left leg pain s/p fall off ladder x2 days ago. Pt was at the top of a ladder when it broke, causing him to fall onto driveway, hit head, no LOC. Denies fever, chills, any other acute c/o. No medications taken for pain. Not on anticoagulation.

## 2019-09-10 RX ORDER — ONDANSETRON 8 MG/1
1 TABLET, FILM COATED ORAL
Qty: 9 | Refills: 0
Start: 2019-09-10 | End: 2019-09-12

## 2019-09-12 ENCOUNTER — APPOINTMENT (OUTPATIENT)
Age: 64
End: 2019-09-12
Payer: COMMERCIAL

## 2019-09-12 DIAGNOSIS — F19.90 OTHER PSYCHOACTIVE SUBSTANCE USE, UNSPECIFIED, UNCOMPLICATED: ICD-10-CM

## 2019-09-12 DIAGNOSIS — Z78.9 OTHER SPECIFIED HEALTH STATUS: ICD-10-CM

## 2019-09-12 PROCEDURE — 99203 OFFICE O/P NEW LOW 30 MIN: CPT

## 2019-09-12 PROCEDURE — 73502 X-RAY EXAM HIP UNI 2-3 VIEWS: CPT | Mod: 26,LT

## 2019-09-12 PROCEDURE — 73552 X-RAY EXAM OF FEMUR 2/>: CPT | Mod: 26,LT

## 2019-09-19 PROBLEM — Z78.9 NO PERTINENT PAST MEDICAL HISTORY: Status: RESOLVED | Noted: 2019-09-12 | Resolved: 2019-09-19

## 2019-09-19 PROBLEM — F19.90 DRUG USE: Status: ACTIVE | Noted: 2019-09-12

## 2019-09-19 NOTE — ADDENDUM
[FreeTextEntry1] : This note was written by Razia To on 09/19/2019 acting as a scribe for MARIXA MALONE

## 2019-09-19 NOTE — PHYSICAL EXAM
[de-identified] : Left Thigh:\par The patient was having some thigh pain.  He states it is a "numbness and tingling kind of a pain" in his thigh.  He has no tenderness on palpation.  He has numbness and tingling down the extremity, not past the knee area. \par \par Left Knee: \par Range of Motion in Degrees	\par 	                  Claimant:	Normal:	\par Flexion Active	  135 	                135-degrees	\par Flexion Passive	  135	                135-degrees	\par Extension Active	  0-5	                0-5-degrees	\par Extension Passive	  0-5	                0-5-degrees\par \par Left Hip: \par Range of Motion in Degrees:\par 	                                 Claimant:	   Normal:	\par Flexion (Active) 	                 120 	   120-degrees	\par Flexion (Passive)	                 120	   120-degrees	\par Extension (Active)	                 -30	   -30-degrees	\par Extension (Passive)	 -30	   -30-degrees	\par Abduction (Active)	                 45-50	   02-48-lfbawfn	\par Abduction (Passive)	 45-50	   42-70-ekkjtat	\par Adduction (Active)  	 20-30	   92-75-lnjvnne	\par Adduction (Passive)	 20-30	   53-42-hebnhos	\par Internal Rotation (Active) 	 35	   35-degrees	\par Internal Rotation (Passive)	 35	   35-degrees	\par External Rotation (Active)	 45	   45-degrees	\par External Rotation (Passive)	 45	   45-degrees	\par \par Right Knee: \par Range of Motion in Degrees	\par 	                  Claimant:	Normal:	\par Flexion Active	  135 	                135-degrees	\par Flexion Passive	  135	                135-degrees	\par Extension Active	  0-5	                0-5-degrees	\par Extension Passive	  0-5	                0-5-degrees	\par \par No weakness to flexion/extension.  No evidence of instability in the AP plane or varus or valgus stress.  Negative  Lachman.  Negative pivot shift.  Negative anterior drawer test.  Negative posterior drawer test.  Negative Melita.  Negative Apley grind.  No medial or lateral joint line tenderness.  No tenderness over the medial and lateral facet of the patella.  No patellofemoral crepitations.  No lateral tilting patella.  No patellar apprehension.  No crepitation in the medial and lateral femoral condyle.  No proximal or distal swelling, edema or tenderness.  No gross motor or sensory deficits.  No intra-articular swelling.  2+ DP and PT pulses. No varus or valgus malalignment.  Skin is intact.  No rashes, scars or lesions.  \par \par Right Hip: \par Range of Motion in Degrees:\par 	                                 Claimant:	   Normal:	\par Flexion (Active) 	                 120 	   120-degrees	\par Flexion (Passive)	                 120	   120-degrees	\par Extension (Active)	                 -30	   -30-degrees	\par Extension (Passive)	 -30	   -30-degrees	\par Abduction (Active)	                 45-50	   65-35-gggycga	\par Abduction (Passive)	 45-50	   76-09-cpddtdu	\par Adduction (Active)  	 20-30	   67-61-rakskpj	\par Adduction (Passive)	 20-30	   64-67-fluulpd	\par Internal Rotation (Active) 	 35	   35-degrees	\par Internal Rotation (Passive)	 35	   35-degrees	\par External Rotation (Active)	 45	   45-degrees	\par External Rotation (Passive)	 45	   45-degrees	\par \par No tenderness with internal or external rotation or axial load.  No tenderness to palpation over the greater trochanter.  Negative Trendelenburg.  No tenderness with resisted abduction.  No weakness to flexion, extension, abduction or adduction.  No evidence of instability.  No motor or sensory deficits.  2+ DP and PT pulses.  Skin is intact.  No scars, rashes or lesions.  \par  [de-identified] : Ambulating with a slightly antalgic to antalgic gait.  Station:  Normal.  [de-identified] : Appearance:  Well-developed, well-nourished male in no acute distress.\par \par   [de-identified] : Radiographs, two to three views of the left hip, including the pelvis, reveal no acute fractures or dislocations noted.\par \par Radiographs, two views of the left femur, reveal no acute fractures or dislocations noted.

## 2019-09-19 NOTE — REVIEW OF SYSTEMS
[Sight Problems] : vision problems [Headache] : headache [Dizziness] : dizziness [Negative] : Heme/Lymph [FreeTextEntry9] : As noted in HPI

## 2019-09-19 NOTE — DISCUSSION/SUMMARY
[de-identified] : At this time, due to pain in the left thigh, the patient is going to follow up with a spine specialist for possible lower back injury.  The patient is advised to use Advil or Aleve and ice.  He will return to the office as needed.

## 2019-09-19 NOTE — HISTORY OF PRESENT ILLNESS
[de-identified] : The patient comes in today for his left hip/thigh status post a fall off of a 10 ft. ladder.  The patient states he went to the hospital a few days later, had a concussion, but no other fractures or dislocations, as per the hospital.  The patient states the onset/injury occurred on 9/7/19.  This injury is not work related or due to an automobile accident.  The patient states the pain is intermittent.  The patient describes the pain as throbbing.  The patient notes rest and heat makes his symptoms better, while lying down makes his symptoms worse.  The patient indicates pain is at a level of 6 on a pain scale of 0-10.  The patient states the pain is getting better daily, but he wants to make sure there was nothing going on. [7] : the ailment interference is 7/10 [5] : the ailment interference is 5/10 [10  (interferes completely)] : the ailment interference is10/10 (interferes completely) [] : No

## 2019-09-23 ENCOUNTER — EMERGENCY (EMERGENCY)
Facility: HOSPITAL | Age: 64
LOS: 0 days | Discharge: HOME | End: 2019-09-24
Attending: EMERGENCY MEDICINE
Payer: COMMERCIAL

## 2019-09-23 ENCOUNTER — APPOINTMENT (OUTPATIENT)
Dept: PHYSICAL MEDICINE AND REHAB | Facility: CLINIC | Age: 64
End: 2019-09-23
Payer: COMMERCIAL

## 2019-09-23 ENCOUNTER — APPOINTMENT (OUTPATIENT)
Dept: ORTHOPEDIC SURGERY | Facility: CLINIC | Age: 64
End: 2019-09-23
Payer: COMMERCIAL

## 2019-09-23 VITALS
WEIGHT: 235 LBS | SYSTOLIC BLOOD PRESSURE: 172 MMHG | DIASTOLIC BLOOD PRESSURE: 83 MMHG | HEIGHT: 73 IN | BODY MASS INDEX: 31.14 KG/M2 | HEART RATE: 84 BPM | TEMPERATURE: 98.1 F

## 2019-09-23 VITALS
TEMPERATURE: 98 F | RESPIRATION RATE: 18 BRPM | OXYGEN SATURATION: 94 % | HEIGHT: 73 IN | SYSTOLIC BLOOD PRESSURE: 161 MMHG | HEART RATE: 93 BPM | WEIGHT: 235.01 LBS | DIASTOLIC BLOOD PRESSURE: 99 MMHG

## 2019-09-23 VITALS
HEIGHT: 73 IN | HEART RATE: 96 BPM | WEIGHT: 235 LBS | SYSTOLIC BLOOD PRESSURE: 145 MMHG | RESPIRATION RATE: 20 BRPM | OXYGEN SATURATION: 97 % | BODY MASS INDEX: 31.14 KG/M2 | DIASTOLIC BLOOD PRESSURE: 83 MMHG

## 2019-09-23 DIAGNOSIS — Z91.041 RADIOGRAPHIC DYE ALLERGY STATUS: ICD-10-CM

## 2019-09-23 DIAGNOSIS — M54.2 CERVICALGIA: ICD-10-CM

## 2019-09-23 DIAGNOSIS — R51 HEADACHE: ICD-10-CM

## 2019-09-23 DIAGNOSIS — Z91.040 LATEX ALLERGY STATUS: ICD-10-CM

## 2019-09-23 DIAGNOSIS — D56.9 THALASSEMIA, UNSPECIFIED: ICD-10-CM

## 2019-09-23 LAB
ANION GAP SERPL CALC-SCNC: 8 MMOL/L — SIGNIFICANT CHANGE UP (ref 5–17)
BASOPHILS # BLD AUTO: 0.02 K/UL — SIGNIFICANT CHANGE UP (ref 0–0.2)
BASOPHILS NFR BLD AUTO: 0.2 % — SIGNIFICANT CHANGE UP (ref 0–2)
BUN SERPL-MCNC: 16 MG/DL — SIGNIFICANT CHANGE UP (ref 7–23)
CALCIUM SERPL-MCNC: 8.6 MG/DL — SIGNIFICANT CHANGE UP (ref 8.5–10.1)
CHLORIDE SERPL-SCNC: 107 MMOL/L — SIGNIFICANT CHANGE UP (ref 96–108)
CO2 SERPL-SCNC: 26 MMOL/L — SIGNIFICANT CHANGE UP (ref 22–31)
CREAT SERPL-MCNC: 1.12 MG/DL — SIGNIFICANT CHANGE UP (ref 0.5–1.3)
EOSINOPHIL # BLD AUTO: 0.08 K/UL — SIGNIFICANT CHANGE UP (ref 0–0.5)
EOSINOPHIL NFR BLD AUTO: 0.9 % — SIGNIFICANT CHANGE UP (ref 0–6)
GLUCOSE SERPL-MCNC: 108 MG/DL — HIGH (ref 70–99)
HCT VFR BLD CALC: 35.5 % — LOW (ref 39–50)
HGB BLD-MCNC: 10.9 G/DL — LOW (ref 13–17)
IMM GRANULOCYTES NFR BLD AUTO: 0.3 % — SIGNIFICANT CHANGE UP (ref 0–1.5)
LYMPHOCYTES # BLD AUTO: 1.55 K/UL — SIGNIFICANT CHANGE UP (ref 1–3.3)
LYMPHOCYTES # BLD AUTO: 17.3 % — SIGNIFICANT CHANGE UP (ref 13–44)
MCHC RBC-ENTMCNC: 21.8 PG — LOW (ref 27–34)
MCHC RBC-ENTMCNC: 30.7 GM/DL — LOW (ref 32–36)
MCV RBC AUTO: 71.1 FL — LOW (ref 80–100)
MONOCYTES # BLD AUTO: 0.62 K/UL — SIGNIFICANT CHANGE UP (ref 0–0.9)
MONOCYTES NFR BLD AUTO: 6.9 % — SIGNIFICANT CHANGE UP (ref 2–14)
NEUTROPHILS # BLD AUTO: 6.68 K/UL — SIGNIFICANT CHANGE UP (ref 1.8–7.4)
NEUTROPHILS NFR BLD AUTO: 74.4 % — SIGNIFICANT CHANGE UP (ref 43–77)
PLATELET # BLD AUTO: 184 K/UL — SIGNIFICANT CHANGE UP (ref 150–400)
POTASSIUM SERPL-MCNC: 3.9 MMOL/L — SIGNIFICANT CHANGE UP (ref 3.5–5.3)
POTASSIUM SERPL-SCNC: 3.9 MMOL/L — SIGNIFICANT CHANGE UP (ref 3.5–5.3)
RBC # BLD: 4.99 M/UL — SIGNIFICANT CHANGE UP (ref 4.2–5.8)
RBC # FLD: 15.6 % — HIGH (ref 10.3–14.5)
SODIUM SERPL-SCNC: 141 MMOL/L — SIGNIFICANT CHANGE UP (ref 135–145)
WBC # BLD: 8.98 K/UL — SIGNIFICANT CHANGE UP (ref 3.8–10.5)
WBC # FLD AUTO: 8.98 K/UL — SIGNIFICANT CHANGE UP (ref 3.8–10.5)

## 2019-09-23 PROCEDURE — 85025 COMPLETE CBC W/AUTO DIFF WBC: CPT

## 2019-09-23 PROCEDURE — 70450 CT HEAD/BRAIN W/O DYE: CPT

## 2019-09-23 PROCEDURE — 96361 HYDRATE IV INFUSION ADD-ON: CPT

## 2019-09-23 PROCEDURE — 99284 EMERGENCY DEPT VISIT MOD MDM: CPT | Mod: 25

## 2019-09-23 PROCEDURE — 99284 EMERGENCY DEPT VISIT MOD MDM: CPT

## 2019-09-23 PROCEDURE — 72125 CT NECK SPINE W/O DYE: CPT

## 2019-09-23 PROCEDURE — 36415 COLL VENOUS BLD VENIPUNCTURE: CPT

## 2019-09-23 PROCEDURE — 93880 EXTRACRANIAL BILAT STUDY: CPT

## 2019-09-23 PROCEDURE — 70450 CT HEAD/BRAIN W/O DYE: CPT | Mod: 26

## 2019-09-23 PROCEDURE — 72125 CT NECK SPINE W/O DYE: CPT | Mod: 26

## 2019-09-23 PROCEDURE — 96374 THER/PROPH/DIAG INJ IV PUSH: CPT

## 2019-09-23 PROCEDURE — 96375 TX/PRO/DX INJ NEW DRUG ADDON: CPT

## 2019-09-23 PROCEDURE — 99203 OFFICE O/P NEW LOW 30 MIN: CPT

## 2019-09-23 PROCEDURE — 80048 BASIC METABOLIC PNL TOTAL CA: CPT

## 2019-09-23 PROCEDURE — 99213 OFFICE O/P EST LOW 20 MIN: CPT

## 2019-09-23 RX ORDER — FLUTICASONE PROPIONATE 110 UG/1
110 AEROSOL, METERED RESPIRATORY (INHALATION) TWICE DAILY
Qty: 1 | Refills: 1 | Status: DISCONTINUED | COMMUNITY
Start: 2019-01-28 | End: 2019-09-23

## 2019-09-23 RX ORDER — MORPHINE SULFATE 50 MG/1
4 CAPSULE, EXTENDED RELEASE ORAL ONCE
Refills: 0 | Status: DISCONTINUED | OUTPATIENT
Start: 2019-09-23 | End: 2019-09-23

## 2019-09-23 RX ORDER — SODIUM CHLORIDE 9 MG/ML
1000 INJECTION INTRAMUSCULAR; INTRAVENOUS; SUBCUTANEOUS ONCE
Refills: 0 | Status: COMPLETED | OUTPATIENT
Start: 2019-09-23 | End: 2019-09-23

## 2019-09-23 RX ORDER — ONDANSETRON 8 MG/1
4 TABLET, FILM COATED ORAL ONCE
Refills: 0 | Status: COMPLETED | OUTPATIENT
Start: 2019-09-23 | End: 2019-09-23

## 2019-09-23 RX ADMIN — MORPHINE SULFATE 4 MILLIGRAM(S): 50 CAPSULE, EXTENDED RELEASE ORAL at 22:02

## 2019-09-23 RX ADMIN — ONDANSETRON 4 MILLIGRAM(S): 8 TABLET, FILM COATED ORAL at 22:02

## 2019-09-23 RX ADMIN — SODIUM CHLORIDE 1000 MILLILITER(S): 9 INJECTION INTRAMUSCULAR; INTRAVENOUS; SUBCUTANEOUS at 22:52

## 2019-09-23 RX ADMIN — SODIUM CHLORIDE 1000 MILLILITER(S): 9 INJECTION INTRAMUSCULAR; INTRAVENOUS; SUBCUTANEOUS at 21:52

## 2019-09-23 NOTE — ED STATDOCS - PATIENT PORTAL LINK FT
You can access the FollowMyHealth Patient Portal offered by [facility] by registering at the following website: [email or web address]. By joining [employer]’s FollowMyHealth portal, you will also be able to view your health information using other applications (apps) compatible with our system.

## 2019-09-23 NOTE — ED STATDOCS - OBJECTIVE STATEMENT
65 y/o m with PMHx of mediterranean anemia presenting to the ED c/o worsening HA, right sided neck pain x 4:30 today. +dizziness, nausea. Pt states that he was had a baseline HA for the past two weeks but at 4:30 today the pain became worse. Pt states that he was seen in ED 2 weeks ago, after falling off a ladder. Pt had a CT scan done, which was negative.

## 2019-09-23 NOTE — PHYSICAL EXAM
[Normal] : Alert and in no acute distress [de-identified] : seated in chair. HR in 90s, Bilateral radial pulses well felt [de-identified] : EOMI, non sustained right gaze end nystagmus- 1-2 beats, No abnormal smooth pursuits or saccades.  [de-identified] : No swelling or erythema noted. Tenderness on palpation of right side of neck with limitation of range right lateral rotation/bending and extension. Left sided ROM appears normal [de-identified] : aaox3, recall intact, Motor, sensory exam to light touch normal,  Reflexes are 1+ bilaterally at biceps, triceps, brachioradialis, and patella. FNF testing intact. Gait normal. Tandem walking intact. No symptoms on head turns on fixed point

## 2019-09-23 NOTE — ED STATDOCS - PROGRESS NOTE DETAILS
ct head, cerical spine and us carotid negative will give toreadol for remainng pain and valium for muscle spasm d/c [place] with wife B Azalea DO

## 2019-09-23 NOTE — ED ADULT TRIAGE NOTE - CHIEF COMPLAINT QUOTE
Patient states that he has severe right sided neck pain and headache that started tonight around 430 pm . Patient states he was at [facility] 2 weeks ago for fall off ladder.

## 2019-09-23 NOTE — ED STATDOCS - ATTENDING CONTRIBUTION TO CARE
Attending Contribution to Care: I, Yaa Alexander, performed the initial face to face bedside interview with this patient regarding history of present illness, review of symptoms and relevant past medical, social and family history.  I completed an independent physical examination.  I was the initial provider who evaluated this patient and the history, physical, and MDM reflect this intial assessment. I have signed out the follow up of any pending tests after the original (i.e. labs, radiological studies) to the ACP with instructions to review any with instructions to review any concerning findings to me prior to discharge.  I have communicated the patient’s plan of care and disposition with the ACP.

## 2019-09-23 NOTE — HISTORY OF PRESENT ILLNESS
[FreeTextEntry1] : DOI: 9/7/2019\par \par Mr. Ronquillo is a 64 year old male who has been referred to the office for possible concussion. 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 He fell on his left side, hit head and since then had pain and numbness in left thigh area and headache. Denies LOC. He got up, had some dizziness/nausea and vomited x1 over the 24 hr period. He did not seek medical attention. Slept through most  of the next day ( Sunday), went to work ( works as a ), was able to work only for 2 hrs and then came home due to symptoms of nausea/dizziness and feeling shaky. He went to [facility] ED where he had a CT head that was negative for any acute issues. He was referred to orthopedics for left symptoms and to the concussion clinic. He was prescribed zofran for nausea which he has been taking intermittently.  he has been working light duty for about 2 weeks and today attempted to return to full duty, but feels very tired. He takes tylenol for headaches with minimal relief\par Since yesterday he also has been having right sided  localised throbbing neck pain, non radiating. He has been taking advil 2 gel tabs 2 tabs 3-4 times /day with good relief. He denies any numbness in right upper extremity or weakness. He denies any heavy lifting or any other falls or trauma\par Headache is bifrontal without associated diplopia. reports some blurry vision. Relieved with rest and advil. He reports dizziness mainly in the morning when he gets up. He denies any vertigo/tinnitus\par He reports poor sleep and fatigue. \par \par Concussion symptom score: 46

## 2019-09-23 NOTE — ED ADULT NURSE NOTE - CHIEF COMPLAINT QUOTE
Patient states that he has severe right sided neck pain and headache that started tonight around 430 pm . Patient states he was at [facility] 2 weeks ago for fall off ladder.

## 2019-09-23 NOTE — ED ADULT NURSE NOTE - OBJECTIVE STATEMENT
Pt presents to ER c/o right sided neck pain and headache. onset of symptoms began at 1630 today. Pt reports having a fall off a ladder earlier this month and is following a spine specialist. Neuro intact. AO x 3, oriented to baseline, normal breathing pattern with no difficulty. Gait stable

## 2019-09-23 NOTE — ASSESSMENT
[FreeTextEntry1] : Mr. Ronquillo is a 64  year old male, s/p fall ( height of 10')from ladder with headache and left sided left pain/numbness. \par \par Concussion:\par \par Patient has symptoms suggestive of concussion. It is 2 weeks since the fall and expect symptoms to improve. He may continue advil for pain over next 2-3 days. If HA persists will follow up at clinic.\par For sleep, discussed sleep hygiene and avoidance of day time naps. May use melatonin prn\par May continue work and driving as tolerated\par For left lower extremity, he has been referred by orthopedics to spine specialist\par \par \par Post Traumatic HA:May continue NSAIDs for 2-3 days. (Previously taking tylenol with minimal relief)\par If persistent follow up at clinic\par \par \par Right sided non radiating neck pain: Patient to follow up with Spine physicians as per orthopedic referral\par \par Heart rate in 90s: ? due to pain versus other etiology. Patient denies palpitations. FU with PCP

## 2019-09-23 NOTE — ED STATDOCS - CLINICAL SUMMARY MEDICAL DECISION MAKING FREE TEXT BOX
Pt with continued HA, nausea and now with right sided neck pain s/p fall off ladder 2 weeks ago with negative CAT scan. Will give pain medication and antinausea medication. Pt is allergic to IV contrast and unable to do CTA, will get CAT scan of head, US of carotid.

## 2019-09-23 NOTE — SOCIAL HISTORY
[No Device Needed] : Patient doesn't use a device for ambulation [FreeTextEntry6] : Occasional alcohol use, Smokes regularly\par Retired , now works as a \par + Driving - Has been driving intermittently since this injury

## 2019-09-23 NOTE — REVIEW OF SYSTEMS
[Patient Intake Form Reviewed] : Patient intake form was reviewed [Fatigue] : fatigue [Headache] : headache [Dizziness] : dizziness [Fainting] : no fainting [Difficulty Walking] : no difficulty walking [de-identified] : numbness left thigh

## 2019-09-24 ENCOUNTER — FORM ENCOUNTER (OUTPATIENT)
Age: 64
End: 2019-09-24

## 2019-09-24 VITALS
RESPIRATION RATE: 18 BRPM | HEART RATE: 88 BPM | DIASTOLIC BLOOD PRESSURE: 69 MMHG | SYSTOLIC BLOOD PRESSURE: 132 MMHG | TEMPERATURE: 98 F | OXYGEN SATURATION: 95 %

## 2019-09-24 PROCEDURE — 93880 EXTRACRANIAL BILAT STUDY: CPT | Mod: 26

## 2019-09-24 RX ORDER — CYCLOBENZAPRINE HYDROCHLORIDE 10 MG/1
1 TABLET, FILM COATED ORAL
Qty: 21 | Refills: 0
Start: 2019-09-24 | End: 2019-09-30

## 2019-09-24 RX ORDER — DIAZEPAM 5 MG
10 TABLET ORAL ONCE
Refills: 0 | Status: DISCONTINUED | OUTPATIENT
Start: 2019-09-24 | End: 2019-09-24

## 2019-09-24 RX ORDER — KETOROLAC TROMETHAMINE 30 MG/ML
30 SYRINGE (ML) INJECTION ONCE
Refills: 0 | Status: DISCONTINUED | OUTPATIENT
Start: 2019-09-24 | End: 2019-09-24

## 2019-09-24 RX ADMIN — Medication 30 MILLIGRAM(S): at 02:57

## 2019-09-24 RX ADMIN — MORPHINE SULFATE 4 MILLIGRAM(S): 50 CAPSULE, EXTENDED RELEASE ORAL at 01:41

## 2019-09-24 RX ADMIN — Medication 10 MILLIGRAM(S): at 02:57

## 2019-09-24 NOTE — ED ADULT NURSE REASSESSMENT NOTE - NS ED NURSE REASSESS COMMENT FT1
Patient agreed to all verbal and written discharge instructions. Patient agreed to follow up with PCP/PMD. Patient education preformed on signs/symptoms to return to the ED. Patient is alert, oriented, and ambulatory at time of discharge

## 2019-09-25 ENCOUNTER — APPOINTMENT (OUTPATIENT)
Dept: RADIOLOGY | Facility: CLINIC | Age: 64
End: 2019-09-25
Payer: COMMERCIAL

## 2019-09-25 ENCOUNTER — APPOINTMENT (OUTPATIENT)
Dept: NEUROSURGERY | Facility: CLINIC | Age: 64
End: 2019-09-25
Payer: COMMERCIAL

## 2019-09-25 ENCOUNTER — APPOINTMENT (OUTPATIENT)
Dept: NEUROSURGERY | Facility: CLINIC | Age: 64
End: 2019-09-25

## 2019-09-25 ENCOUNTER — OUTPATIENT (OUTPATIENT)
Dept: OUTPATIENT SERVICES | Facility: HOSPITAL | Age: 64
LOS: 1 days | End: 2019-09-25
Payer: COMMERCIAL

## 2019-09-25 DIAGNOSIS — Z00.8 ENCOUNTER FOR OTHER GENERAL EXAMINATION: ICD-10-CM

## 2019-09-25 DIAGNOSIS — F17.200 NICOTINE DEPENDENCE, UNSPECIFIED, UNCOMPLICATED: ICD-10-CM

## 2019-09-25 PROCEDURE — 72110 X-RAY EXAM L-2 SPINE 4/>VWS: CPT | Mod: 26

## 2019-09-25 PROCEDURE — 72110 X-RAY EXAM L-2 SPINE 4/>VWS: CPT

## 2019-09-25 PROCEDURE — 72040 X-RAY EXAM NECK SPINE 2-3 VW: CPT | Mod: 26

## 2019-09-25 PROCEDURE — 99202 OFFICE O/P NEW SF 15 MIN: CPT

## 2019-09-25 PROCEDURE — 72040 X-RAY EXAM NECK SPINE 2-3 VW: CPT

## 2019-09-25 PROCEDURE — 99212 OFFICE O/P EST SF 10 MIN: CPT

## 2019-09-27 PROBLEM — F17.200 CURRENT EVERY DAY SMOKER: Status: ACTIVE | Noted: 2019-09-25

## 2019-09-27 NOTE — CONSULT LETTER
[Dear  ___] : Dear  [unfilled], [Courtesy Letter:] : I had the pleasure of seeing your patient, [unfilled], in my office today. [Sincerely,] : Sincerely, [FreeTextEntry2] : Kareem Matthew MD\par [address]\[place] [address] [FreeTextEntry3] : Yaritza Hope, MSN, FNP-BC\par Nurse Practitioner\par Neurosurgery\par [address] [place] [address], [address]nd floor \[place] [address] \par Office: [phone] \par Fax: [phone]\par \par  [FreeTextEntry1] : Tomás Ronquillo is a 64-year-old male who presents today with cervical and lumbar symptoms. Patient states on September 7, 2019, he had fallen off a ladder causing him to strike the left side of his body. Patient reports right sided Constant 8/10 neck pain. He reports decreased range of motion. Patient reports right shoulder discomfort and tingling. Patient reports tingling to all the fingers of his right hand. Patient denies any shooting pains or weakness to his bilateral upper extremities. He denies any left sided symptoms.\par \par In regards to his lumbar symptoms, patient reports constant numbness, tingling, and pain to his left anterior thigh. He denies any lower back pain or bowel or bladder dysfunction. Patient denies any shooting pains down his legs. He does report tripping over his left foot. Patient states walking helps his symptoms at times. He denies any mechanical back pain.\par \par Patient was seen in the emergency room the day of the fall as well as September 23, 2019. Patient had a CT of the head and CT of the cervical spine performed. Report indicates no fractures or hemorrhages noted. Patient was provided morphine and Zofran in the emergency room. He was given Flexeril which has not provided him with any relief. Patient was recently seen by orthopedics who was referred into our services. Patient has attempted ice with no relief. Heat provides him with some relief. Advil also provide him with some relief.\par \par There is no imaging to review with the patient appeared\par \par Patient is alert oriented. No distress noted. Negative Muñoz's. Negative clonus. Strength to bilateral arms and legs 5/5. Bilateral arm reflexes present and equal. Bilateral patellar reflexes symmetric and normal. Right Achilles reflex absent. Left Achilles reflex present.\par \par I have provided the patient with a prescription for an x-ray of the cervical and lumbar spine. I have also asked that the patient undergo MRIs of the cervical and lumbar spine. We will follow up via phone once imaging is completed. Patient is aware to call our office with any further questions or concerns or with any new or worsening symptoms.\par

## 2019-09-27 NOTE — REVIEW OF SYSTEMS
[Feeling Tired] : feeling tired [Vomiting] : vomiting [Anxiety] : anxiety [Negative] : Heme/Lymph [de-identified] : Headaches [FreeTextEntry7] : Nausea [FreeTextEntry3] : Intolerance to light/ Blurry Vision [FreeTextEntry9] : Muscle Pain

## 2019-09-29 ENCOUNTER — FORM ENCOUNTER (OUTPATIENT)
Age: 64
End: 2019-09-29

## 2019-09-30 ENCOUNTER — APPOINTMENT (OUTPATIENT)
Dept: MRI IMAGING | Facility: CLINIC | Age: 64
End: 2019-09-30
Payer: COMMERCIAL

## 2019-09-30 ENCOUNTER — OUTPATIENT (OUTPATIENT)
Dept: OUTPATIENT SERVICES | Facility: HOSPITAL | Age: 64
LOS: 1 days | End: 2019-09-30
Payer: COMMERCIAL

## 2019-09-30 DIAGNOSIS — Z00.8 ENCOUNTER FOR OTHER GENERAL EXAMINATION: ICD-10-CM

## 2019-09-30 PROCEDURE — 72148 MRI LUMBAR SPINE W/O DYE: CPT

## 2019-09-30 PROCEDURE — 72148 MRI LUMBAR SPINE W/O DYE: CPT | Mod: 26

## 2019-09-30 PROCEDURE — 72141 MRI NECK SPINE W/O DYE: CPT

## 2019-09-30 PROCEDURE — 72141 MRI NECK SPINE W/O DYE: CPT | Mod: 26

## 2019-09-30 NOTE — PHYSICAL EXAM
[de-identified] : Right Hip: Range of Motion in Degrees:\par 	                                 Claimant:	   Normal:	\par Flexion (Active) 	                 120 	   120-degrees	\par Flexion (Passive)	                 120	   120-degrees	\par Extension (Active)	                 -30	   -30-degrees	\par Extension (Passive)	 -30	   -30-degrees	\par Abduction (Active)	                 45-50	   50-86-jberedk	\par Abduction (Passive)	 45-50	   17-08-nggtkhh	\par Adduction (Active)  	 20-30	   37-02-zsbnfhy	\par Adduction (Passive)	 20-30	   63-36-upyycee	\par Internal Rotation (Active) 	 35	   35-degrees	\par Internal Rotation (Passive)	 35	   35-degrees	\par External Rotation (Active)	 45	   45-degrees	\par External Rotation (Passive)	 45	   45-degrees	\par \par No tenderness with internal or external rotation or axial load.  No tenderness to palpation over the greater trochanter.  Negative Trendelenburg.  No tenderness with resisted abduction.  No weakness to flexion, extension, abduction or adduction.  No evidence of instability.  No motor or sensory deficits.  2+ DP and PT pulses.  Skin is intact.  No scars, rashes or lesions.  \par  \par Left Hip: Range of Motion in Degrees:\par 	                                 Claimant:	   Normal:	\par Flexion (Active) 	                 120 	   120-degrees	\par Flexion (Passive)	                 120	   120-degrees	\par Extension (Active)	                 -30	   -30-degrees	\par Extension (Passive)	 -30	   -30-degrees	\par Abduction (Active)	                 45-50	   56-50-ooigcik	\par Abduction (Passive)	 45-50	   66-56-jtxkalc	\par Adduction (Active)  	 20-30	   43-83-mqqyumy	\par Adduction (Passive)	 20-30	   79-52-tijekux	\par Internal Rotation (Active) 	 35	   35-degrees	\par Internal Rotation (Passive)	 35	   35-degrees	\par External Rotation (Active)	 45	   45-degrees	\par External Rotation (Passive)	 45	   45-degrees	\par \par No tenderness with internal or external rotation or axial load.  No tenderness to palpation over the greater trochanter.  Negative Trendelenburg.  No tenderness with resisted abduction.  No weakness to flexion, extension, abduction or adduction.  No evidence of instability.  No motor or sensory deficits.  2+ DP and PT pulses.  Skin is intact.  No scars, rashes or lesions.  \par  \par Left Knee: Range of Motion in Degrees	\par 	                  Claimant:	Normal:	\par Flexion Active	  135 	                135-degrees	\par Flexion Passive	  135	                135-degrees	\par Extension Active	  0-5	                0-5-degrees	\par Extension Passive	  0-5	                0-5-degrees	\par \par No weakness to flexion/extension.  No evidence of instability in the AP plane or varus or valgus stress.  Negative  Lachman.  Negative pivot shift.  Negative anterior drawer test.  Negative posterior drawer test.  Negative Melita.  Negative Apley grind.  No medial or lateral joint line tenderness.  No tenderness over the medial and lateral facet of the patella.  No patellofemoral crepitations.  No lateral tilting patella.  No patellar apprehension.  No crepitation in the medial and lateral femoral condyle.  No proximal or distal swelling, edema or tenderness.  No gross motor or sensory deficits.  No intra-articular swelling.  2+ DP and PT pulses. No varus or valgus malalignment.  Skin is intact.  No rashes, scars or lesions.  \par   [de-identified] : Gait and Station:  Ambulating with a slightly antalgic to antalgic gait.  Station:  Normal.  [de-identified] : Appearance:  Well-developed, well-nourished male in no acute distress.\par   [de-identified] : Review of radiographs show no obvious osseous abnormalities in his femur.  Hip and knee show some mild degenerative changes.

## 2019-09-30 NOTE — CONSULT LETTER
[Dear  ___] : Dear  [unfilled], [Referral Letter:] : I am referring [unfilled] to you for further evaluation.  My most recent evaluation follows. [Please see my note below.] : Please see my note below. [Referral Closing:] : Thank you very much for seeing this patient.  If you have any questions, please do not hesitate to contact me. [Sincerely,] : Sincerely, [FreeTextEntry3] : Iban Telles III, MD\par JOSUE/ottoniel

## 2019-09-30 NOTE — HISTORY OF PRESENT ILLNESS
[de-identified] : The patient comes in today with persistent throbbing and he points to the anterior aspect of his thigh down towards his knee.  He states occasionally he feels some stiffness.  Denies any complaints to his knee or his hip.

## 2019-09-30 NOTE — ADDENDUM
[FreeTextEntry1] : This note was written by Oxana Archer on 09/30/2019 acting as scribe for Iban Telles III, MD

## 2019-09-30 NOTE — DISCUSSION/SUMMARY
[de-identified] : At this time, due to what sounds more like a femoral nerve radiculopathy, I recommend spine evaluation.  I advised the patient if symptoms persist after that, he should come back and see me.

## 2019-10-02 ENCOUNTER — RX RENEWAL (OUTPATIENT)
Age: 64
End: 2019-10-02

## 2019-10-11 ENCOUNTER — APPOINTMENT (OUTPATIENT)
Dept: NEUROSURGERY | Facility: CLINIC | Age: 64
End: 2019-10-11
Payer: COMMERCIAL

## 2019-10-11 DIAGNOSIS — M54.12 RADICULOPATHY, CERVICAL REGION: ICD-10-CM

## 2019-10-11 PROCEDURE — 99214 OFFICE O/P EST MOD 30 MIN: CPT

## 2019-10-15 PROBLEM — M54.12 CERVICAL RADICULOPATHY, ACUTE: Status: RESOLVED | Noted: 2019-09-23 | Resolved: 2019-10-15

## 2019-10-15 NOTE — CONSULT LETTER
[Dear  ___] : Dear  [unfilled], [Courtesy Letter:] : I had the pleasure of seeing your patient, [unfilled], in my office today. [Sincerely,] : Sincerely, [FreeTextEntry2] : Kareem Matthew MD\par [address]\par [place] [address] \par \par  [FreeTextEntry1] : Mr. Ronquillo is a very pleasant 64-year-old male patient who was seen today in followup regarding his neck pain. The patient returned today to review his imaging findings.\par \par Briefly, the patient recently suffered a fall on September 7, 2019 off a ladder. The patient subsequently complained of right-sided neck pain rated at a 8/10 in severity and milder low back pain. The patient had tingling in the fingers following his fall but no radiating pain in his upper lower extremities. At today's visit, the patient's neck pain is now rated a 6/10 which is a slight improvement from previous. The patient states that his neck still feels very stiff. The patient continues to deny any radiating pain into his arms. The patient continues to have headaches and is being followed by a concussion clinic elsewhere. Finally, the patient also has left-sided thigh pain on review of systems. \par \par On examination, patient is alert, oriented, and compliant with the exam. The patient continues to demonstrate 5/5 strength in the upper and lower extremities bilaterally. The patient has significantly limited range of motion of the cervical spine secondary to pain.\par \par The patient is accompanied with MRI scan of the cervical spine dated September 30, 2019. This image demonstrates paravertebral T2 signal change between C2-C5 suggestive of ligamentous injury. A flexion/extension x-ray did not reveal any dynamic instability. In the lumbar spine MRI scan performed the same day, there is mild degenerative changes with a disc bulge at L4/5 causing bilateral foraminal stenosis. \par \par Taken together, the patient has a clinical history of neck pain secondary to a severe cervical ligamentous sprain. At this time, given that it has only been 4 weeks since his injury, I recommended resting his neck to allow healing. I have provided a soft collar for comfort as well as a prescription for Mobic and a Medrol dosepak. I have asked the patient to refrain from any significant physical therapy of the neck for another 2 weeks which would eddie 6 weeks following his injury. From a lower extremity perspective, I have explained that the patient's left-sided thigh pain is most likely traumatic and not secondary to nerve or compression, but we will also continue to monitor this at his followup visit. I have recommended followup in approximately 2 weeks' time to reevaluate his progress and clear him for physical therapy at that time appear. [FreeTextEntry3] : Hung Deleon MD, PhD, FRCPSC \par Attending Neurosurgeon \par [facility] \par [address], 2nd floor \[place] [address] \par Office: [phone] \par Fax: [phone]\par \par

## 2019-10-29 ENCOUNTER — APPOINTMENT (OUTPATIENT)
Dept: NEUROSURGERY | Facility: CLINIC | Age: 64
End: 2019-10-29
Payer: COMMERCIAL

## 2019-10-29 VITALS
SYSTOLIC BLOOD PRESSURE: 170 MMHG | HEIGHT: 73 IN | HEART RATE: 69 BPM | DIASTOLIC BLOOD PRESSURE: 79 MMHG | OXYGEN SATURATION: 97 % | TEMPERATURE: 98.3 F | BODY MASS INDEX: 32.28 KG/M2 | WEIGHT: 243.56 LBS

## 2019-10-29 PROCEDURE — 99213 OFFICE O/P EST LOW 20 MIN: CPT

## 2019-10-29 NOTE — CONSULT LETTER
[Dear  ___] : Dear  [unfilled], [Courtesy Letter:] : I had the pleasure of seeing your patient, [unfilled], in my office today. [Sincerely,] : Sincerely, [FreeTextEntry2] : Kareem Matthew MD\par [address]\par [place] [address] \par \par  [FreeTextEntry1] : Mr. Ronquillo is a very pleasant 64-year-old male patient who was seen in our office today in regards to subacute neck pain.\par \par Briefly, the patient was previously seen in our office after a fall on September 7, 2019. The patient subsequently underwent an MRI scan of the cervical spine and lumbar spine dated September 30, 2019. These images demonstrated mild degenerative changes in the lumbar spine with paravertebral T2 signal change in the cervical spine suggestive of ligamentous injury. The patient was provided a soft collar and asked to return to our office approximately 6 weeks following his injury to be reassessed for physical therapy.\par \par In the interim, the patient states that he has been doing better. His pain is now rated at a 5/10 in severity. This is an improvement compared to his previous state. The patient states that he is returned to the gym and has been using an elliptical. The patient uses his soft collar for comfort only. The patient still has some difficulty sleeping at night because of pain. The patient currently only takes Advil for pain.\par \par I have no new imaging to review today.\par \par Taken together, the patient has a clinical presentation and imaging findings most consistent with routine healing after a traumatic injury to the cervical spine. At this time, I explained the patient he should try to wean himself off the soft collar while also participating in physical therapy for her neck range of motion and core strengthening exercises. The patient is motivated to participate with physical therapy at this time. Given the patient’s current trajectory, I have explained to the patient that he does not need any regularly scheduled follow up with our office, but we would be happy to see the patient back at any time should the need arise.  [FreeTextEntry3] : Hung Deleon MD, PhD, FRCPSC \par Attending Neurosurgeon \par [facility] \par [address], [address]nd floor \[place] [address] \par Office: [phone] \par Fax: [phone]\par \par

## 2020-01-02 ENCOUNTER — APPOINTMENT (OUTPATIENT)
Dept: INTERNAL MEDICINE | Facility: CLINIC | Age: 65
End: 2020-01-02
Payer: COMMERCIAL

## 2020-01-02 ENCOUNTER — NON-APPOINTMENT (OUTPATIENT)
Age: 65
End: 2020-01-02

## 2020-01-02 VITALS
SYSTOLIC BLOOD PRESSURE: 140 MMHG | HEART RATE: 87 BPM | OXYGEN SATURATION: 97 % | TEMPERATURE: 98.7 F | RESPIRATION RATE: 18 BRPM | BODY MASS INDEX: 31.94 KG/M2 | HEIGHT: 73 IN | WEIGHT: 241 LBS | DIASTOLIC BLOOD PRESSURE: 80 MMHG

## 2020-01-02 PROCEDURE — 99214 OFFICE O/P EST MOD 30 MIN: CPT | Mod: 25

## 2020-01-02 PROCEDURE — 94060 EVALUATION OF WHEEZING: CPT

## 2020-01-02 RX ORDER — IBUPROFEN 200 MG/1
TABLET, COATED ORAL
Refills: 0 | Status: COMPLETED | COMMUNITY
End: 2020-01-02

## 2020-01-02 RX ORDER — METHYLPREDNISOLONE 4 MG/1
4 TABLET ORAL
Qty: 1 | Refills: 0 | Status: COMPLETED | COMMUNITY
Start: 2019-10-11 | End: 2020-01-02

## 2020-01-02 RX ORDER — ONDANSETRON 4 MG/1
4 TABLET, ORALLY DISINTEGRATING ORAL
Qty: 9 | Refills: 0 | Status: DISCONTINUED | COMMUNITY
Start: 2019-09-10

## 2020-01-02 RX ORDER — ONDANSETRON HYDROCHLORIDE 4 MG/1
4 TABLET, FILM COATED ORAL
Refills: 0 | Status: COMPLETED | COMMUNITY
End: 2020-01-02

## 2020-01-02 RX ORDER — METHYLPREDNISOLONE 4 MG/1
4 TABLET ORAL
Qty: 1 | Refills: 1 | Status: COMPLETED | COMMUNITY
Start: 2019-10-02 | End: 2020-01-02

## 2020-01-02 RX ORDER — ALBUTEROL SULFATE 90 UG/1
108 (90 BASE) AEROSOL, METERED RESPIRATORY (INHALATION)
Qty: 1 | Refills: 2 | Status: COMPLETED | COMMUNITY
Start: 2018-10-29 | End: 2020-01-02

## 2020-01-02 NOTE — HISTORY OF PRESENT ILLNESS
[FreeTextEntry8] : Pt presents  to the office today with complaints of increased shortness  of breath and cough with green colored sputum over the last week. His wife is sick and well as his grandchildren he saw over the Holidays. he has been using Albuterol every days which has provided transient relief. Unfortunately he continue to smoke 1 PPD . He is to start the 9/11 smoking cessation program on January 8 . \par He is particularly sensitive to noxious fumes and uses a respirator when at job sites. \par Denies chest pain, fever, chills, weigh loss, hemoptysis, night sweats. \par

## 2020-01-02 NOTE — DATA REVIEWED
[FreeTextEntry1] : pre/post spirometry performed. MOderate restriction with improvement post bronchodilator

## 2020-01-02 NOTE — COUNSELING
[Risk of tobacco use and health benefits of smoking cessation discussed] : Risk of tobacco use and health benefits of smoking cessation discussed [Tobacco Use Cessation Intermediate Greater Than 3 Minutes Up to 10 Minutes] : Tobacco Use Cessation Intermediate Greater Than 3 Minutes Up to 10 Minutes

## 2020-01-02 NOTE — REVIEW OF SYSTEMS
[Fever] : no fever [Fatigue] : no fatigue [Chills] : no chills [Shortness Of Breath] : shortness of breath [Cough] : cough [Wheezing] : no wheezing [Dyspnea on Exertion] : dyspnea on exertion [Negative] : Psychiatric

## 2020-01-02 NOTE — PHYSICAL EXAM
[No Acute Distress] : no acute distress [Well Developed] : well developed [Well Nourished] : well nourished [Normal Outer Ear/Nose] : the outer ears and nose were normal in appearance [Normal TMs] : both tympanic membranes were normal [Normal Oropharynx] : the oropharynx was normal [Supple] : supple [No Lymphadenopathy] : no lymphadenopathy [No Respiratory Distress] : no respiratory distress  [No Accessory Muscle Use] : no accessory muscle use [Regular Rhythm] : with a regular rhythm [Normal Rate] : normal rate  [Normal S1, S2] : normal S1 and S2 [Normal Posterior Cervical Nodes] : no posterior cervical lymphadenopathy [Coordination Grossly Intact] : coordination grossly intact [Normal Anterior Cervical Nodes] : no anterior cervical lymphadenopathy [Normal Affect] : the affect was normal [No Focal Deficits] : no focal deficits [Normal Gait] : normal gait [Alert and Oriented x3] : oriented to person, place, and time [Normal Insight/Judgement] : insight and judgment were intact [de-identified] : expiratory wheeze on forced maneuvers

## 2020-01-02 NOTE — ASSESSMENT
[FreeTextEntry1] : acute asthmatic bronchitis\par Prednisone 20 mg po BID x 6 days\par Doxycycline 100 m g po BID x 7 days\par Continue Flovent daily, Proair QID PRN as needed for wheeze\par Smoking cessation discussed , will be attending the  9/11 smoking cessation program \par Call if not improving \par To ER with emergent symptoms \par

## 2020-01-16 ENCOUNTER — APPOINTMENT (OUTPATIENT)
Dept: NEUROSURGERY | Facility: CLINIC | Age: 65
End: 2020-01-16
Payer: COMMERCIAL

## 2020-01-16 VITALS
HEIGHT: 73 IN | WEIGHT: 249.25 LBS | BODY MASS INDEX: 33.03 KG/M2 | TEMPERATURE: 99 F | DIASTOLIC BLOOD PRESSURE: 83 MMHG | HEART RATE: 95 BPM | SYSTOLIC BLOOD PRESSURE: 158 MMHG | OXYGEN SATURATION: 96 %

## 2020-01-16 PROCEDURE — 99214 OFFICE O/P EST MOD 30 MIN: CPT

## 2020-01-21 NOTE — CONSULT LETTER
[Dear  ___] : Dear  [unfilled], [Courtesy Letter:] : I had the pleasure of seeing your patient, [unfilled], in my office today. [Sincerely,] : Sincerely, [FreeTextEntry2] : Kareem Matthew MD\par [address]\[place] [address]  [FreeTextEntry1] : Mr. Ronquillo is a very pleasant 65-year-old male patient who was seen in our office previously in regards to chronic neck pain. The patient returned today in regards to his low back pain.\par \par Briefly, the patient was previously diagnosed with a mild ligamentous injury in the cervical spine after a fall off a ladder. The patient's pain has been consistently improving, but still remains. The patient has been participating with physical therapy with some benefit. The patient has not had physical therapy currently, the patient states that his neck is approximately 80% better. The patient does still occasionally have muscle spasms in the neck and lower back. In addition, the patient's lower back pain is accompanied with right-sided thigh and knee numbness. The patient denies any new bowel or bladder symptoms, clumsiness, or difficulty walking.\par \par On examination, the patient is alert, oriented, and compliant with the exam. The patient demonstrates 5/5 strength in the lower extremities bilaterally. The patient has a mildly limited range of motion of the lumbar spine secondary to pain. The patient demonstrates 2+ reflexes at the patellar tendons and 1+ reflexes at the Achilles tendons. The patient does not have a Muñoz sign her ankle clonus.\par \par The patient is accompanied with his previous MRI scan of the lumbar spine dated September 30, 2019. There are significant degenerative changes in lumbar spine with right-sided foraminal stenosis at L4/5, likely impinging upon the L4 and L5 nerve roots.\par \par Taken together, the patient has a clinical history and radiographic findings most consistent with chronic low back pain secondary to arthritic, muscular, and neurogenic causes. I explained to the patient that he is a surgical candidate for a lumbar decompression if his leg pain becomes his primary concern. Currently, the patient states that it is his axial back pain which is causing him the most distress. I have recommended physical therapy to include his lumbar spine and hopefully this will provide him some relief. If not, we will obtain an updated MRI scan of the lumbar spine to rule out progressive structural causes. I have counseled the patient that high-energy impact slide falling off a ladder causes muscular and ligamentous injury throughout, and may take weeks to months to fully recover. At this time, I recommended follow up with us in approximately 6 weeks’ time to reevaluate his progress and I look forward to seeing him then. [FreeTextEntry3] : Hung Deleon MD, PhD, FRCPSC \par Attending Neurosurgeon \par [facility] \par [address], [address]nd floor \[place] [address] \par Office: [phone] \par Fax: [phone]\par \par

## 2020-02-07 NOTE — ED ADULT NURSE NOTE - NSIMPLEMENTINTERV_GEN_ALL_ED
[employer]    Physical Therapy Treatment Note/ Progress Report:     Date:  2020    Patient Name:  Hellen King    :  1983  MRN: [id number]  Restrictions/Precautions:    Medical/Treatment Diagnosis Information:  Diagnosis: right heel pain M79.671      Treatment Diagnosis: R ankle pain C34.670   Insurance/Certification information:  [facility], $325 deductible, $40 copay, 30 OP visits. Physician Information:  Referring Practitioner: Kevin Childers DO         Plan of care signed (Y/N):     Date of Patient follow up with Physician:      Progress Report: []  Yes  [x]  No     Functional Scale:    Date:    Date Range for reporting period:  Beginnin20  Endin20    Progress report due (10 Rx/or 30 days whichever is less):      Recertification due (POC duration/ or 90 days whichever is less): 20     Visit # Insurance Allowable Auth Needed   7 MN []Yes    []No     Pain level:  0-2/10     SUBJECTIVE: Pt reports no change in symptoms. Notes more pain when walking around the house. OBJECTIVE: See eval   Observation:    Test measurements:      RESTRICTIONS/PRECAUTIONS: R insertion achilles tendinopathy, loading progression,     Exercises/Interventions:     Therapeutic Ex Resistance Sets/sec Reps Notes   Retro Stepper/BIKE       Gastroc/soleus stretch    2x30\" ea    Ankle tband ROM  purple 2 10 PF/DF     10#  2x8-15    Seated heel raise   2x8-15 Off half foam   Side stepping   x4 laps    Dead bugs   x10    TrA activation    x10                                                                   Manual Intervention       STM/IASTM   25 min     Tib/Fem Mobs       Patella Mobs       Ankle mobs                     NMR re-education       [place]/Biofeedback 10/10       G. Med activaiton/sidelying       G. Max Activation/prone       Hip Ext full ROM G.  Activation       Bosu Bal and Prop- G Med       Single leg stance/Balance/Prop Millicent Borges G. Med act                         Therapeutic Exercise and NMR EXR  [x] (01257) Provided verbal/tactile cueing for activities related to strengthening, flexibility, endurance, ROM for improvements in LE, proximal hip, and core control with self care, mobility, lifting, ambulation.  [] (86884) Provided verbal/tactile cueing for activities related to improving balance, coordination, kinesthetic sense, posture, motor skill, proprioception  to assist with LE, proximal hip, and core control in self care, mobility, lifting, ambulation and eccentric single leg control.      NMR and Therapeutic Activities:    [] (21050 or 61327) Provided verbal/tactile cueing for activities related to improving balance, coordination, kinesthetic sense, posture, motor skill, proprioception and motor activation to allow for proper function of core, proximal hip and LE with self care and ADLs  [] (29751) Gait Re-education- Provided training and instruction to the patient for proper LE, core and proximal hip recruitment and positioning and eccentric body weight control with ambulation re-education including up and down stairs     Home Exercise Program:    [x] (87322) Reviewed/Progressed HEP activities related to strengthening, flexibility, endurance, ROM of core, proximal hip and LE for functional self-care, mobility, lifting and ambulation/stair navigation   [] (34528)Reviewed/Progressed HEP activities related to improving balance, coordination, kinesthetic sense, posture, motor skill, proprioception of core, proximal hip and LE for self care, mobility, lifting, and ambulation/stair navigation      Manual Treatments:  PROM / STM / Oscillations-Mobs:  G-I, II, III, IV (PA's, Inf., Post.)  [] (51174) Provided manual therapy to mobilize LE, proximal hip and/or LS spine soft tissue/joints for the purpose of modulating pain, promoting relaxation,  increasing ROM, reducing/eliminating soft tissue swelling/inflammation/restriction, Less tenderness noted mid tendon following manual techniques. Heel lift distributed today and educated to wear over the weekend. KT tape applied for swelling. Treatment/Activity Tolerance:  [x] Patient tolerated treatment well [x] Patient limited by fatique  [] Patient limited by pain  [] Patient limited by other medical complications  [] Other:     Overall Progression Towards Functional goals/ Treatment Progress Update:  [] Patient is progressing as expected towards functional goals listed. [] Progression is slowed due to complexities/Impairments listed. [] Progression has been slowed due to co-morbidities. [x] Plan just implemented, too soon to assess goals progression <30days   [] Goals require adjustment due to lack of progress  [] Patient is not progressing as expected and requires additional follow up with physician  [] Other    Prognosis for POC: [x] Good [] Fair  [] Poor    Patient requires continued skilled intervention: [x] Yes  [] No        PLAN: Decrease heel pain to allow for a pain free ambulation. [x] Continue per plan of care [] Alter current plan (see comments)  [] Plan of care initiated [] Hold pending MD visit [] Discharge    Electronically signed by: Marlen Wolf, PT    Note: If patient does not return for scheduled/recommended follow up visits, this note will serve as a discharge from care along with the most recent update on progress. Implemented All Universal Safety Interventions:  [place] to call system. Call bell, personal items and telephone within reach. Instruct patient to call for assistance. Room bathroom lighting operational. Non-slip footwear when patient is off stretcher. Physically safe environment: no spills, clutter or unnecessary equipment. Stretcher in lowest position, wheels locked, appropriate side rails in place.

## 2020-02-27 ENCOUNTER — APPOINTMENT (OUTPATIENT)
Dept: NEUROSURGERY | Facility: CLINIC | Age: 65
End: 2020-02-27

## 2020-02-28 NOTE — ED STATDOCS - NS_EDPROVIDERDISPOUSERTYPE_ED_A_ED
[Follow-up Visit ___] : a follow-up visit  for [unfilled] Scribe Attestation (For Scribes USE Only)... Scribe Attestation (For Scribes USE Only).../Attending Attestation (For Attendings USE Only)...

## 2020-03-24 ENCOUNTER — APPOINTMENT (OUTPATIENT)
Dept: NEUROSURGERY | Facility: CLINIC | Age: 65
End: 2020-03-24

## 2020-05-12 ENCOUNTER — APPOINTMENT (OUTPATIENT)
Dept: NEUROSURGERY | Facility: CLINIC | Age: 65
End: 2020-05-12
Payer: COMMERCIAL

## 2020-05-12 VITALS
HEIGHT: 73 IN | OXYGEN SATURATION: 94 % | SYSTOLIC BLOOD PRESSURE: 160 MMHG | HEART RATE: 81 BPM | WEIGHT: 240 LBS | BODY MASS INDEX: 31.81 KG/M2 | TEMPERATURE: 97.9 F | DIASTOLIC BLOOD PRESSURE: 94 MMHG

## 2020-05-12 DIAGNOSIS — F41.9 ANXIETY DISORDER, UNSPECIFIED: ICD-10-CM

## 2020-05-12 PROCEDURE — 99214 OFFICE O/P EST MOD 30 MIN: CPT

## 2020-05-12 RX ORDER — DOXYCYCLINE 100 MG/1
100 CAPSULE ORAL TWICE DAILY
Qty: 14 | Refills: 0 | Status: DISCONTINUED | COMMUNITY
Start: 2020-01-02 | End: 2020-05-12

## 2020-05-12 RX ORDER — MELOXICAM 7.5 MG/1
7.5 TABLET ORAL TWICE DAILY
Qty: 30 | Refills: 0 | Status: DISCONTINUED | COMMUNITY
Start: 2019-10-11 | End: 2020-05-12

## 2020-05-12 RX ORDER — PREDNISONE 20 MG/1
20 TABLET ORAL
Qty: 12 | Refills: 0 | Status: DISCONTINUED | COMMUNITY
Start: 2020-01-02 | End: 2020-05-12

## 2020-05-12 RX ORDER — CELECOXIB 200 MG/1
200 CAPSULE ORAL TWICE DAILY
Qty: 60 | Refills: 0 | Status: DISCONTINUED | COMMUNITY
Start: 2020-03-23 | End: 2020-05-12

## 2020-05-12 RX ORDER — CYCLOBENZAPRINE HYDROCHLORIDE 7.5 MG/1
7.5 TABLET, FILM COATED ORAL 3 TIMES DAILY
Qty: 30 | Refills: 0 | Status: DISCONTINUED | COMMUNITY
Start: 2020-01-16 | End: 2020-05-12

## 2020-05-12 NOTE — CONSULT LETTER
[Dear  ___] : Dear  [unfilled], [Courtesy Letter:] : I had the pleasure of seeing your patient, [unfilled], in my office today. [Sincerely,] : Sincerely, [FreeTextEntry2] : Kareem Matthew MD\par [address]\[place] [address]  [FreeTextEntry1] : Mr. Ronquillo is a very pleasant 65-year-old male patient who was seen in our office in follow-up.  The patient was previously seen in regards to neck pain, low back pain, and right-sided leg pain.\par \par Briefly, the patient was previously diagnosed with ligamentous injury in the cervical spine after a fall from a ladder.  In addition, his previous imaging demonstrated severe right-sided foraminal and lateral recess stenosis at L4/5 likely contributing to his back and right-sided leg pain.  Over the course of the last several months, the patient has been engaged in conservative therapies with good results.  Specifically, the patient was referred to physical therapy and provided prescriptions for Celebrex and Flexeril.  The patient has since stopped taking Flexeril and Celebrex secondary to gastrointestinal adverse effects.  The patient had been continuing physical therapy and states that his pain is currently a 4/10 in severity in the neck which is a significant improvement.  The patient states that he feels approximately 80% better.  However, the patient's pain remains in the neck particularly with rotation to the right.The patient's pain of the lower back continues to radiate into the right leg and has not changed significantly since his last visit.  The patient denies any other joint joint pains, and states that he has not had reddened or inflamed joints in his hands or feet suggestive of a systemic arthritis. \par \par On examination, the patient is alert, oriented, and compliant with the exam.  The patient demonstrates 5/5 strength in the upper and lower extremities bilaterally. The patient demonstrates 2+ reflexes in the upper and lower extremities except with the Achilles tendons which is rated at 11+ bilaterally.\par \par The patient is accompanied with his previous MRI scans I took the time to go over the patient's previous MRI scans of the cervical and lumbar spine performed in September 2019.  The patient cervical spine images demonstrate significant facet arthropathy localized to the C2/3 facet joint on the left and C4/5 facet joint on the right.  In addition, the patient had evidence of a ligamentous injury between C3/4.  In the lumbar spine, the patient has significant right-sided foraminal stenosis and lateral recess stenosis at L4/5 with a mild scoliosis.\par \par Taken together, the patient has a clinical history and radiographic findings most consistent with chronic neck pain secondary to musculoskeletal causes.  In addition, the patient has a clinical history and radiographic findings consistent with a lumbar radiculopathy secondary to foraminal and lateral recess stenosis at L4/5.  I have reiterated to the patient that he would be a surgical candidate to decompress his lumbar spine should he fail conservative management therapies. At this time, the patient would like to continue conservative therapy in that regard. Notably, the patient has quit smoking and started a weight loss program which is highly encouraging.  From the patient's neck perspective, I have recommended a repeat MRI scan to ensure adequate resolution and healing of his ligamentous injury.  In addition, I have recommended a flexion/extension x-ray to rule out any dynamic instability.  At this time, I explained to the patient that his neck pain is most likely the results of his facet arthropathy and ligamentous injury.  Thankfully, it appears that the patient's ligamentous injury is healing and thus that part of his pain has improved.  However, the patient's facet arthropathy remains and I have explained that this arthropathy is unlikely to resolve spontaneously.  I have, however, recommended reattempting Celebrex at this time, as this medication would be helpful for his arthritic pain.  From the lumbar spine perspective, the patient is clear that his symptoms are currently being well managed.  we have recommended follow-up with us once his updated imaging is complete so that we can review the results with him then. [FreeTextEntry3] : Hung Deleon MD, PhD, FRCPSC \par Attending Neurosurgeon \par [facility] \par [address] \[place] [address] \par Office: [phone] \par Fax: [phone]\par \par

## 2020-05-13 PROBLEM — F41.9 ANXIETY: Status: ACTIVE | Noted: 2020-05-13

## 2020-05-20 ENCOUNTER — APPOINTMENT (OUTPATIENT)
Dept: RADIOLOGY | Facility: CLINIC | Age: 65
End: 2020-05-20

## 2020-06-01 ENCOUNTER — OUTPATIENT (OUTPATIENT)
Dept: OUTPATIENT SERVICES | Facility: HOSPITAL | Age: 65
LOS: 1 days | End: 2020-06-01
Payer: COMMERCIAL

## 2020-06-01 ENCOUNTER — RESULT REVIEW (OUTPATIENT)
Age: 65
End: 2020-06-01

## 2020-06-01 ENCOUNTER — APPOINTMENT (OUTPATIENT)
Dept: RADIOLOGY | Facility: CLINIC | Age: 65
End: 2020-06-01
Payer: COMMERCIAL

## 2020-06-01 DIAGNOSIS — Z00.8 ENCOUNTER FOR OTHER GENERAL EXAMINATION: ICD-10-CM

## 2020-06-01 PROCEDURE — 72050 X-RAY EXAM NECK SPINE 4/5VWS: CPT | Mod: 26

## 2020-06-01 PROCEDURE — 72050 X-RAY EXAM NECK SPINE 4/5VWS: CPT

## 2020-06-07 ENCOUNTER — INPATIENT (INPATIENT)
Facility: HOSPITAL | Age: 65
LOS: 1 days | Discharge: HOME | DRG: 871 | End: 2020-06-09
Attending: FAMILY MEDICINE | Admitting: HOSPITALIST
Payer: COMMERCIAL

## 2020-06-07 VITALS
SYSTOLIC BLOOD PRESSURE: 154 MMHG | HEART RATE: 117 BPM | HEIGHT: 73 IN | WEIGHT: 248.02 LBS | DIASTOLIC BLOOD PRESSURE: 78 MMHG | RESPIRATION RATE: 22 BRPM | TEMPERATURE: 101 F

## 2020-06-07 DIAGNOSIS — R60.0 LOCALIZED EDEMA: ICD-10-CM

## 2020-06-07 DIAGNOSIS — J44.9 CHRONIC OBSTRUCTIVE PULMONARY DISEASE, UNSPECIFIED: ICD-10-CM

## 2020-06-07 DIAGNOSIS — D56.9 THALASSEMIA, UNSPECIFIED: ICD-10-CM

## 2020-06-07 DIAGNOSIS — D35.02 BENIGN NEOPLASM OF LEFT ADRENAL GLAND: ICD-10-CM

## 2020-06-07 DIAGNOSIS — J45.901 UNSPECIFIED ASTHMA WITH (ACUTE) EXACERBATION: ICD-10-CM

## 2020-06-07 DIAGNOSIS — Z91.041 RADIOGRAPHIC DYE ALLERGY STATUS: ICD-10-CM

## 2020-06-07 DIAGNOSIS — K43.2 INCISIONAL HERNIA WITHOUT OBSTRUCTION OR GANGRENE: ICD-10-CM

## 2020-06-07 DIAGNOSIS — Z91.040 LATEX ALLERGY STATUS: ICD-10-CM

## 2020-06-07 DIAGNOSIS — R00.0 TACHYCARDIA, UNSPECIFIED: ICD-10-CM

## 2020-06-07 DIAGNOSIS — D69.6 THROMBOCYTOPENIA, UNSPECIFIED: ICD-10-CM

## 2020-06-07 DIAGNOSIS — J18.9 PNEUMONIA, UNSPECIFIED ORGANISM: ICD-10-CM

## 2020-06-07 DIAGNOSIS — R91.8 OTHER NONSPECIFIC ABNORMAL FINDING OF LUNG FIELD: ICD-10-CM

## 2020-06-07 DIAGNOSIS — Z20.828 CONTACT WITH AND (SUSPECTED) EXPOSURE TO OTHER VIRAL COMMUNICABLE DISEASES: ICD-10-CM

## 2020-06-07 DIAGNOSIS — R73.9 HYPERGLYCEMIA, UNSPECIFIED: ICD-10-CM

## 2020-06-07 DIAGNOSIS — A41.9 SEPSIS, UNSPECIFIED ORGANISM: ICD-10-CM

## 2020-06-07 DIAGNOSIS — Z88.8 ALLERGY STATUS TO OTHER DRUGS, MEDICAMENTS AND BIOLOGICAL SUBSTANCES STATUS: ICD-10-CM

## 2020-06-07 DIAGNOSIS — I24.8 OTHER FORMS OF ACUTE ISCHEMIC HEART DISEASE: ICD-10-CM

## 2020-06-07 DIAGNOSIS — D72.810 LYMPHOCYTOPENIA: ICD-10-CM

## 2020-06-07 DIAGNOSIS — Z81.8 FAMILY HISTORY OF OTHER MENTAL AND BEHAVIORAL DISORDERS: ICD-10-CM

## 2020-06-07 DIAGNOSIS — E87.2 ACIDOSIS: ICD-10-CM

## 2020-06-07 DIAGNOSIS — I25.10 ATHEROSCLEROTIC HEART DISEASE OF NATIVE CORONARY ARTERY WITHOUT ANGINA PECTORIS: ICD-10-CM

## 2020-06-07 DIAGNOSIS — Z87.891 PERSONAL HISTORY OF NICOTINE DEPENDENCE: ICD-10-CM

## 2020-06-07 LAB
ALBUMIN SERPL ELPH-MCNC: 3.8 G/DL — SIGNIFICANT CHANGE UP (ref 3.3–5)
ALP SERPL-CCNC: 70 U/L — SIGNIFICANT CHANGE UP (ref 40–120)
ALT FLD-CCNC: 61 U/L — SIGNIFICANT CHANGE UP (ref 12–78)
ANION GAP SERPL CALC-SCNC: 8 MMOL/L — SIGNIFICANT CHANGE UP (ref 5–17)
APPEARANCE UR: CLEAR — SIGNIFICANT CHANGE UP
APTT BLD: 23.4 SEC — LOW (ref 27.5–36.3)
AST SERPL-CCNC: 24 U/L — SIGNIFICANT CHANGE UP (ref 15–37)
BASOPHILS # BLD AUTO: 0.02 K/UL — SIGNIFICANT CHANGE UP (ref 0–0.2)
BASOPHILS NFR BLD AUTO: 0.3 % — SIGNIFICANT CHANGE UP (ref 0–2)
BILIRUB SERPL-MCNC: 0.8 MG/DL — SIGNIFICANT CHANGE UP (ref 0.2–1.2)
BILIRUB UR-MCNC: NEGATIVE — SIGNIFICANT CHANGE UP
BUN SERPL-MCNC: 20 MG/DL — SIGNIFICANT CHANGE UP (ref 7–23)
CALCIUM SERPL-MCNC: 8.4 MG/DL — LOW (ref 8.5–10.1)
CHLORIDE SERPL-SCNC: 106 MMOL/L — SIGNIFICANT CHANGE UP (ref 96–108)
CO2 SERPL-SCNC: 24 MMOL/L — SIGNIFICANT CHANGE UP (ref 22–31)
COLOR SPEC: YELLOW — SIGNIFICANT CHANGE UP
CREAT SERPL-MCNC: 0.98 MG/DL — SIGNIFICANT CHANGE UP (ref 0.5–1.3)
DIFF PNL FLD: ABNORMAL
EOSINOPHIL # BLD AUTO: 0.01 K/UL — SIGNIFICANT CHANGE UP (ref 0–0.5)
EOSINOPHIL NFR BLD AUTO: 0.2 % — SIGNIFICANT CHANGE UP (ref 0–6)
GLUCOSE SERPL-MCNC: 182 MG/DL — HIGH (ref 70–99)
GLUCOSE UR QL: NEGATIVE MG/DL — SIGNIFICANT CHANGE UP
HCT VFR BLD CALC: 36 % — LOW (ref 39–50)
HGB BLD-MCNC: 11.1 G/DL — LOW (ref 13–17)
IMM GRANULOCYTES NFR BLD AUTO: 0.6 % — SIGNIFICANT CHANGE UP (ref 0–1.5)
INR BLD: 1.19 RATIO — HIGH (ref 0.88–1.16)
KETONES UR-MCNC: ABNORMAL
LACTATE SERPL-SCNC: 3.1 MMOL/L — HIGH (ref 0.7–2)
LEUKOCYTE ESTERASE UR-ACNC: NEGATIVE — SIGNIFICANT CHANGE UP
LYMPHOCYTES # BLD AUTO: 0.57 K/UL — LOW (ref 1–3.3)
LYMPHOCYTES # BLD AUTO: 8.9 % — LOW (ref 13–44)
MCHC RBC-ENTMCNC: 21.5 PG — LOW (ref 27–34)
MCHC RBC-ENTMCNC: 30.8 GM/DL — LOW (ref 32–36)
MCV RBC AUTO: 69.6 FL — LOW (ref 80–100)
MONOCYTES # BLD AUTO: 0.05 K/UL — SIGNIFICANT CHANGE UP (ref 0–0.9)
MONOCYTES NFR BLD AUTO: 0.8 % — LOW (ref 2–14)
NEUTROPHILS # BLD AUTO: 5.74 K/UL — SIGNIFICANT CHANGE UP (ref 1.8–7.4)
NEUTROPHILS NFR BLD AUTO: 89.2 % — HIGH (ref 43–77)
NITRITE UR-MCNC: NEGATIVE — SIGNIFICANT CHANGE UP
NT-PROBNP SERPL-SCNC: 129 PG/ML — HIGH (ref 0–125)
PH UR: 5 — SIGNIFICANT CHANGE UP (ref 5–8)
PLATELET # BLD AUTO: 136 K/UL — LOW (ref 150–400)
POTASSIUM SERPL-MCNC: 3.7 MMOL/L — SIGNIFICANT CHANGE UP (ref 3.5–5.3)
POTASSIUM SERPL-SCNC: 3.7 MMOL/L — SIGNIFICANT CHANGE UP (ref 3.5–5.3)
PROT SERPL-MCNC: 7.5 GM/DL — SIGNIFICANT CHANGE UP (ref 6–8.3)
PROT UR-MCNC: 30 MG/DL
PROTHROM AB SERPL-ACNC: 13.3 SEC — HIGH (ref 10–12.9)
RBC # BLD: 5.17 M/UL — SIGNIFICANT CHANGE UP (ref 4.2–5.8)
RBC # FLD: 15.5 % — HIGH (ref 10.3–14.5)
SODIUM SERPL-SCNC: 138 MMOL/L — SIGNIFICANT CHANGE UP (ref 135–145)
SP GR SPEC: 1.02 — SIGNIFICANT CHANGE UP (ref 1.01–1.02)
UROBILINOGEN FLD QL: 1 MG/DL
WBC # BLD: 6.43 K/UL — SIGNIFICANT CHANGE UP (ref 3.8–10.5)
WBC # FLD AUTO: 6.43 K/UL — SIGNIFICANT CHANGE UP (ref 3.8–10.5)

## 2020-06-07 PROCEDURE — 93010 ELECTROCARDIOGRAM REPORT: CPT

## 2020-06-07 PROCEDURE — 71045 X-RAY EXAM CHEST 1 VIEW: CPT | Mod: 26

## 2020-06-07 RX ORDER — LIDOCAINE HCL 20 MG/ML
10 VIAL (ML) INJECTION ONCE
Refills: 0 | Status: DISCONTINUED | OUTPATIENT
Start: 2020-06-07 | End: 2020-06-07

## 2020-06-07 RX ORDER — SODIUM CHLORIDE 9 MG/ML
1500 INJECTION INTRAMUSCULAR; INTRAVENOUS; SUBCUTANEOUS ONCE
Refills: 0 | Status: COMPLETED | OUTPATIENT
Start: 2020-06-07 | End: 2020-06-07

## 2020-06-07 RX ORDER — ACETAMINOPHEN 500 MG
650 TABLET ORAL ONCE
Refills: 0 | Status: COMPLETED | OUTPATIENT
Start: 2020-06-07 | End: 2020-06-07

## 2020-06-07 RX ORDER — SODIUM CHLORIDE 9 MG/ML
1000 INJECTION INTRAMUSCULAR; INTRAVENOUS; SUBCUTANEOUS ONCE
Refills: 0 | Status: COMPLETED | OUTPATIENT
Start: 2020-06-07 | End: 2020-06-07

## 2020-06-07 RX ORDER — ALBUTEROL 90 UG/1
1 AEROSOL, METERED ORAL ONCE
Refills: 0 | Status: COMPLETED | OUTPATIENT
Start: 2020-06-07 | End: 2020-06-07

## 2020-06-07 RX ORDER — CEFEPIME 1 G/1
2000 INJECTION, POWDER, FOR SOLUTION INTRAMUSCULAR; INTRAVENOUS ONCE
Refills: 0 | Status: COMPLETED | OUTPATIENT
Start: 2020-06-07 | End: 2020-06-07

## 2020-06-07 RX ORDER — IPRATROPIUM BROMIDE 0.2 MG/ML
1 SOLUTION, NON-ORAL INHALATION ONCE
Refills: 0 | Status: COMPLETED | OUTPATIENT
Start: 2020-06-07 | End: 2020-06-07

## 2020-06-07 RX ADMIN — SODIUM CHLORIDE 1000 MILLILITER(S): 9 INJECTION INTRAMUSCULAR; INTRAVENOUS; SUBCUTANEOUS at 23:22

## 2020-06-07 RX ADMIN — ALBUTEROL 1 PUFF(S): 90 AEROSOL, METERED ORAL at 23:47

## 2020-06-07 RX ADMIN — CEFEPIME 100 MILLIGRAM(S): 1 INJECTION, POWDER, FOR SOLUTION INTRAMUSCULAR; INTRAVENOUS at 23:22

## 2020-06-07 RX ADMIN — Medication 650 MILLIGRAM(S): at 21:49

## 2020-06-07 RX ADMIN — Medication 1 PUFF(S): at 23:47

## 2020-06-07 RX ADMIN — CEFEPIME 2000 MILLIGRAM(S): 1 INJECTION, POWDER, FOR SOLUTION INTRAMUSCULAR; INTRAVENOUS at 23:52

## 2020-06-07 RX ADMIN — SODIUM CHLORIDE 1000 MILLILITER(S): 9 INJECTION INTRAMUSCULAR; INTRAVENOUS; SUBCUTANEOUS at 21:49

## 2020-06-07 RX ADMIN — SODIUM CHLORIDE 1000 MILLILITER(S): 9 INJECTION INTRAMUSCULAR; INTRAVENOUS; SUBCUTANEOUS at 23:00

## 2020-06-07 NOTE — ED ADULT NURSE NOTE - NSIMPLEMENTINTERV_GEN_ALL_ED
Implemented All Universal Safety Interventions:  [place] to call system. Call bell, personal items and telephone within reach. Instruct patient to call for assistance. Room bathroom lighting operational. Non-slip footwear when patient is off stretcher. Physically safe environment: no spills, clutter or unnecessary equipment. Stretcher in lowest position, wheels locked, appropriate side rails in place.

## 2020-06-07 NOTE — ED PROVIDER NOTE - CLINICAL SUMMARY MEDICAL DECISION MAKING FREE TEXT BOX
66 yo male presents with chills, fever, sob and new LE edema starting earlier today. Will check labs, trop, bnp, BC and lactate, cxr, ekg, Reeval. -Julio C Barkley PA-C

## 2020-06-07 NOTE — ED ADULT TRIAGE NOTE - CHIEF COMPLAINT QUOTE
Pt presents to ER c/o SOB/fever. Onset of symptoms began one hour PTA. Pt reports not being tested for COVID-19

## 2020-06-07 NOTE — ED ADULT NURSE NOTE - PRIMARY CARE PROVIDER
Answers for HPI/ROS submitted by the patient on 11/9/2017   Annual Exam:  Getting at least 3 servings of Calcium per day:: NO  Bi-annual eye exam:: NO  Dental care twice a year:: Yes  Sleep apnea or symptoms of sleep apnea:: None  Diet:: Regular (no restrictions)  Frequency of exercise:: None  Taking medications regularly:: Yes  Medication side effects:: Not applicable, None  Additional concerns today:: YES  PHQ-2 Score: 0     Tiff

## 2020-06-07 NOTE — ED PROVIDER NOTE - ATTENDING CONTRIBUTION TO CARE
I, Kareem Delong MD, personally saw the patient with the PA, and completed the key components of the history and physical exam. I then discussed the management plan with the PA.

## 2020-06-07 NOTE — ED PROVIDER NOTE - CONSTITUTIONAL, MLM
normal... Well appearing, obese awake, alert, oriented to person, place, time/situation and in no apparent distress.

## 2020-06-07 NOTE — ED PROVIDER NOTE - PROGRESS NOTE DETAILS
Lupillo Reeves for attending Dr. Delong. Will not administer sepsis IV fluids at current time due to suspected COVID infection. Dr. Delong:  Lactate elevated, pt remains tachycardic; sepsis IVF & IV Abx ordered.

## 2020-06-07 NOTE — ED ADULT NURSE NOTE - OBJECTIVE STATEMENT
Pt A&Ox4 brought in by EMS c/o SOB.  Pt reports onset of fever and chills at about 3 pm while driving back from [facility].  Pt has fever of 101.2 oral upon arrival to ED, tachypneic and tachycardic in 120s.  Patient took 325 mg of tylenol at 7 pm PTA.  Pt reports recent new onset of bilateral lower leg edema +2.  PMH of COPD, CAD, mediterranean anemia.  Pt reports small amount of diarrhea last night, denies N/V, headache.

## 2020-06-07 NOTE — ED ADULT NURSE NOTE - CAS TRG GEN SKIN COLOR
"Physical Therapy Evaluation completed.   Bed Mobility:  Supine to Sit: Supervised  Transfers: Sit to Stand: Supervised  Gait: Level Of Assist: Supervised with 4-Wheel Walker       Plan of Care: Patient with no further skilled PT needs in the acute care setting at this time  Discharge Recommendations: Equipment: No Equipment Needed. Post-acute therapy Currently anticipate no further skilled therapy needs once patient is discharged from the inpatient setting.    Pt is an 83 yo male admitted with complaint of allergic reaction, vomiting, lip and tongue swelling. Today pt was supervised for OOB, transfers and ambulation x 200 feet using his FWW. Pt needed min A (contact guard) for up/down stairs. Pt was educated that he should not be alone when doing stairs. Pt showed inconsistent pulse ox readings and will need a recheck by nsg. Pt's wife is supportive and able to help. No further inpt PT needs.     See \"Rehab Therapy-Acute\" Patient Summary Report for complete documentation.     "
Normal for race

## 2020-06-07 NOTE — ED PROVIDER NOTE - OBJECTIVE STATEMENT
64 yo male with a PMH of Mediterranean anemia, COPD, CAD, presents with chills and sub fever since approximately 3pm. Symptoms have been intermittent and accompanied by SOB. Pt states his son was tested + for covid over 1 month ago and wife had a recent negative covid test. +b/l LE edema. Denies cough, cp, abd pain, n/v/d.

## 2020-06-08 DIAGNOSIS — J18.9 PNEUMONIA, UNSPECIFIED ORGANISM: ICD-10-CM

## 2020-06-08 DIAGNOSIS — Z93.3 COLOSTOMY STATUS: Chronic | ICD-10-CM

## 2020-06-08 LAB
ADD ON TEST-SPECIMEN IN LAB: SIGNIFICANT CHANGE UP
ALBUMIN SERPL ELPH-MCNC: 3.4 G/DL — SIGNIFICANT CHANGE UP (ref 3.3–5)
ALP SERPL-CCNC: 65 U/L — SIGNIFICANT CHANGE UP (ref 40–120)
ALT FLD-CCNC: 59 U/L — SIGNIFICANT CHANGE UP (ref 12–78)
ANION GAP SERPL CALC-SCNC: 7 MMOL/L — SIGNIFICANT CHANGE UP (ref 5–17)
AST SERPL-CCNC: 24 U/L — SIGNIFICANT CHANGE UP (ref 15–37)
BASOPHILS # BLD AUTO: 0.02 K/UL — SIGNIFICANT CHANGE UP (ref 0–0.2)
BASOPHILS NFR BLD AUTO: 0.2 % — SIGNIFICANT CHANGE UP (ref 0–2)
BILIRUB SERPL-MCNC: 1.3 MG/DL — HIGH (ref 0.2–1.2)
BUN SERPL-MCNC: 17 MG/DL — SIGNIFICANT CHANGE UP (ref 7–23)
CALCIUM SERPL-MCNC: 7.8 MG/DL — LOW (ref 8.5–10.1)
CHLORIDE SERPL-SCNC: 109 MMOL/L — HIGH (ref 96–108)
CO2 SERPL-SCNC: 22 MMOL/L — SIGNIFICANT CHANGE UP (ref 22–31)
CREAT SERPL-MCNC: 0.89 MG/DL — SIGNIFICANT CHANGE UP (ref 0.5–1.3)
CRP SERPL-MCNC: 4.75 MG/DL — HIGH (ref 0–0.4)
D DIMER BLD IA.RAPID-MCNC: 900 NG/ML DDU — HIGH
EOSINOPHIL # BLD AUTO: 0.01 K/UL — SIGNIFICANT CHANGE UP (ref 0–0.5)
EOSINOPHIL NFR BLD AUTO: 0.1 % — SIGNIFICANT CHANGE UP (ref 0–6)
FERRITIN SERPL-MCNC: 198 NG/ML — SIGNIFICANT CHANGE UP (ref 30–400)
GLUCOSE SERPL-MCNC: 144 MG/DL — HIGH (ref 70–99)
HCT VFR BLD CALC: 33.2 % — LOW (ref 39–50)
HCV AB S/CO SERPL IA: 0.09 S/CO — SIGNIFICANT CHANGE UP (ref 0–0.99)
HCV AB SERPL-IMP: SIGNIFICANT CHANGE UP
HGB BLD-MCNC: 10.2 G/DL — LOW (ref 13–17)
IMM GRANULOCYTES NFR BLD AUTO: 0.4 % — SIGNIFICANT CHANGE UP (ref 0–1.5)
LACTATE SERPL-SCNC: 1.5 MMOL/L — SIGNIFICANT CHANGE UP (ref 0.7–2)
LACTATE SERPL-SCNC: 2.2 MMOL/L — HIGH (ref 0.7–2)
LYMPHOCYTES # BLD AUTO: 0.28 K/UL — LOW (ref 1–3.3)
LYMPHOCYTES # BLD AUTO: 3.3 % — LOW (ref 13–44)
MAGNESIUM SERPL-MCNC: 1.7 MG/DL — SIGNIFICANT CHANGE UP (ref 1.6–2.6)
MCHC RBC-ENTMCNC: 21.3 PG — LOW (ref 27–34)
MCHC RBC-ENTMCNC: 30.7 GM/DL — LOW (ref 32–36)
MCV RBC AUTO: 69.3 FL — LOW (ref 80–100)
MONOCYTES # BLD AUTO: 0.18 K/UL — SIGNIFICANT CHANGE UP (ref 0–0.9)
MONOCYTES NFR BLD AUTO: 2.1 % — SIGNIFICANT CHANGE UP (ref 2–14)
NEUTROPHILS # BLD AUTO: 8.03 K/UL — HIGH (ref 1.8–7.4)
NEUTROPHILS NFR BLD AUTO: 93.9 % — HIGH (ref 43–77)
PHOSPHATE SERPL-MCNC: 1.8 MG/DL — LOW (ref 2.5–4.5)
PLATELET # BLD AUTO: 109 K/UL — LOW (ref 150–400)
POTASSIUM SERPL-MCNC: 3.7 MMOL/L — SIGNIFICANT CHANGE UP (ref 3.5–5.3)
POTASSIUM SERPL-SCNC: 3.7 MMOL/L — SIGNIFICANT CHANGE UP (ref 3.5–5.3)
PROT SERPL-MCNC: 6.9 GM/DL — SIGNIFICANT CHANGE UP (ref 6–8.3)
RBC # BLD: 4.79 M/UL — SIGNIFICANT CHANGE UP (ref 4.2–5.8)
RBC # FLD: 15.7 % — HIGH (ref 10.3–14.5)
SARS-COV-2 RNA SPEC QL NAA+PROBE: SIGNIFICANT CHANGE UP
SODIUM SERPL-SCNC: 138 MMOL/L — SIGNIFICANT CHANGE UP (ref 135–145)
TROPONIN I SERPL-MCNC: 0.05 NG/ML — HIGH (ref 0.01–0.04)
WBC # BLD: 8.55 K/UL — SIGNIFICANT CHANGE UP (ref 3.8–10.5)
WBC # FLD AUTO: 8.55 K/UL — SIGNIFICANT CHANGE UP (ref 3.8–10.5)

## 2020-06-08 PROCEDURE — A9540: CPT

## 2020-06-08 PROCEDURE — 83615 LACTATE (LD) (LDH) ENZYME: CPT

## 2020-06-08 PROCEDURE — 84145 PROCALCITONIN (PCT): CPT

## 2020-06-08 PROCEDURE — 83735 ASSAY OF MAGNESIUM: CPT

## 2020-06-08 PROCEDURE — 82728 ASSAY OF FERRITIN: CPT

## 2020-06-08 PROCEDURE — 85379 FIBRIN DEGRADATION QUANT: CPT

## 2020-06-08 PROCEDURE — 93308 TTE F-UP OR LMTD: CPT

## 2020-06-08 PROCEDURE — 83036 HEMOGLOBIN GLYCOSYLATED A1C: CPT

## 2020-06-08 PROCEDURE — 36415 COLL VENOUS BLD VENIPUNCTURE: CPT

## 2020-06-08 PROCEDURE — 80053 COMPREHEN METABOLIC PANEL: CPT

## 2020-06-08 PROCEDURE — 84100 ASSAY OF PHOSPHORUS: CPT

## 2020-06-08 PROCEDURE — 12345: CPT | Mod: NC,GC

## 2020-06-08 PROCEDURE — 93970 EXTREMITY STUDY: CPT

## 2020-06-08 PROCEDURE — U0003: CPT

## 2020-06-08 PROCEDURE — 85025 COMPLETE CBC W/AUTO DIFF WBC: CPT

## 2020-06-08 PROCEDURE — 83605 ASSAY OF LACTIC ACID: CPT

## 2020-06-08 PROCEDURE — 82962 GLUCOSE BLOOD TEST: CPT

## 2020-06-08 PROCEDURE — 71250 CT THORAX DX C-: CPT | Mod: 26

## 2020-06-08 PROCEDURE — 86140 C-REACTIVE PROTEIN: CPT

## 2020-06-08 PROCEDURE — 93970 EXTREMITY STUDY: CPT | Mod: 26

## 2020-06-08 PROCEDURE — 78580 LUNG PERFUSION IMAGING: CPT

## 2020-06-08 PROCEDURE — 84484 ASSAY OF TROPONIN QUANT: CPT

## 2020-06-08 PROCEDURE — 71250 CT THORAX DX C-: CPT

## 2020-06-08 PROCEDURE — 78580 LUNG PERFUSION IMAGING: CPT | Mod: 26

## 2020-06-08 PROCEDURE — 86803 HEPATITIS C AB TEST: CPT

## 2020-06-08 PROCEDURE — 99223 1ST HOSP IP/OBS HIGH 75: CPT

## 2020-06-08 PROCEDURE — 80048 BASIC METABOLIC PNL TOTAL CA: CPT

## 2020-06-08 PROCEDURE — 82550 ASSAY OF CK (CPK): CPT

## 2020-06-08 RX ORDER — SODIUM CHLORIDE 9 MG/ML
250 INJECTION INTRAMUSCULAR; INTRAVENOUS; SUBCUTANEOUS ONCE
Refills: 0 | Status: COMPLETED | OUTPATIENT
Start: 2020-06-08 | End: 2020-06-08

## 2020-06-08 RX ORDER — CEFEPIME 1 G/1
1000 INJECTION, POWDER, FOR SOLUTION INTRAMUSCULAR; INTRAVENOUS EVERY 12 HOURS
Refills: 0 | Status: DISCONTINUED | OUTPATIENT
Start: 2020-06-08 | End: 2020-06-08

## 2020-06-08 RX ORDER — INSULIN LISPRO 100/ML
VIAL (ML) SUBCUTANEOUS AT BEDTIME
Refills: 0 | Status: DISCONTINUED | OUTPATIENT
Start: 2020-06-08 | End: 2020-06-09

## 2020-06-08 RX ORDER — SODIUM CHLORIDE 9 MG/ML
500 INJECTION INTRAMUSCULAR; INTRAVENOUS; SUBCUTANEOUS ONCE
Refills: 0 | Status: COMPLETED | OUTPATIENT
Start: 2020-06-08 | End: 2020-06-08

## 2020-06-08 RX ORDER — ACETAMINOPHEN 500 MG
650 TABLET ORAL EVERY 6 HOURS
Refills: 0 | Status: DISCONTINUED | OUTPATIENT
Start: 2020-06-08 | End: 2020-06-09

## 2020-06-08 RX ORDER — SODIUM,POTASSIUM PHOSPHATES 278-250MG
1 POWDER IN PACKET (EA) ORAL ONCE
Refills: 0 | Status: COMPLETED | OUTPATIENT
Start: 2020-06-08 | End: 2020-06-08

## 2020-06-08 RX ORDER — SODIUM CHLORIDE 9 MG/ML
1000 INJECTION INTRAMUSCULAR; INTRAVENOUS; SUBCUTANEOUS ONCE
Refills: 0 | Status: DISCONTINUED | OUTPATIENT
Start: 2020-06-08 | End: 2020-06-08

## 2020-06-08 RX ORDER — INSULIN LISPRO 100/ML
VIAL (ML) SUBCUTANEOUS
Refills: 0 | Status: DISCONTINUED | OUTPATIENT
Start: 2020-06-08 | End: 2020-06-08

## 2020-06-08 RX ORDER — DEXTROSE 50 % IN WATER 50 %
25 SYRINGE (ML) INTRAVENOUS ONCE
Refills: 0 | Status: DISCONTINUED | OUTPATIENT
Start: 2020-06-08 | End: 2020-06-09

## 2020-06-08 RX ORDER — GLUCAGON INJECTION, SOLUTION 0.5 MG/.1ML
1 INJECTION, SOLUTION SUBCUTANEOUS ONCE
Refills: 0 | Status: DISCONTINUED | OUTPATIENT
Start: 2020-06-08 | End: 2020-06-09

## 2020-06-08 RX ORDER — DEXTROSE 50 % IN WATER 50 %
12.5 SYRINGE (ML) INTRAVENOUS ONCE
Refills: 0 | Status: DISCONTINUED | OUTPATIENT
Start: 2020-06-08 | End: 2020-06-09

## 2020-06-08 RX ORDER — IPRATROPIUM BROMIDE 0.2 MG/ML
1 SOLUTION, NON-ORAL INHALATION EVERY 6 HOURS
Refills: 0 | Status: DISCONTINUED | OUTPATIENT
Start: 2020-06-08 | End: 2020-06-09

## 2020-06-08 RX ORDER — ALBUTEROL 90 UG/1
1 AEROSOL, METERED ORAL EVERY 4 HOURS
Refills: 0 | Status: DISCONTINUED | OUTPATIENT
Start: 2020-06-08 | End: 2020-06-09

## 2020-06-08 RX ORDER — SODIUM CHLORIDE 9 MG/ML
1000 INJECTION, SOLUTION INTRAVENOUS
Refills: 0 | Status: DISCONTINUED | OUTPATIENT
Start: 2020-06-08 | End: 2020-06-09

## 2020-06-08 RX ORDER — ENOXAPARIN SODIUM 100 MG/ML
40 INJECTION SUBCUTANEOUS DAILY
Refills: 0 | Status: DISCONTINUED | OUTPATIENT
Start: 2020-06-08 | End: 2020-06-09

## 2020-06-08 RX ORDER — DEXTROSE 50 % IN WATER 50 %
15 SYRINGE (ML) INTRAVENOUS ONCE
Refills: 0 | Status: DISCONTINUED | OUTPATIENT
Start: 2020-06-08 | End: 2020-06-09

## 2020-06-08 RX ADMIN — SODIUM CHLORIDE 1500 MILLILITER(S): 9 INJECTION INTRAMUSCULAR; INTRAVENOUS; SUBCUTANEOUS at 00:55

## 2020-06-08 RX ADMIN — SODIUM CHLORIDE 500 MILLILITER(S): 9 INJECTION INTRAMUSCULAR; INTRAVENOUS; SUBCUTANEOUS at 03:40

## 2020-06-08 RX ADMIN — Medication 1 PACKET(S): at 06:14

## 2020-06-08 RX ADMIN — Medication 1: at 08:02

## 2020-06-08 RX ADMIN — ENOXAPARIN SODIUM 40 MILLIGRAM(S): 100 INJECTION SUBCUTANEOUS at 11:05

## 2020-06-08 RX ADMIN — Medication 650 MILLIGRAM(S): at 05:16

## 2020-06-08 NOTE — H&P ADULT - NSHPSOCIALHISTORY_GEN_ALL_CORE
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 He smoke 2 packs of cigarettes daily  for 20 year. He quit smoking 5 months ago. No hx of alcohol abuse or use of ilicit drugs. Skin normal color for race, warm, dry and intact. No evidence of rash.  No tactile warmth.  No external evidence of trauma. 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 He smoked 2 packs of cigarettes daily  for 20 year. He quit smoking 5 months ago. No hx of alcohol abuse or use of illicit drugs.

## 2020-06-08 NOTE — H&P ADULT - NSHPREVIEWOFSYSTEMS_GEN_ALL_CORE
Review of Symptoms  Gen: + fever, chills. No sweats, weight loss, fatigue  Visual: no recent changes in vision, no blurriness, no seeing spots  Cardiovascular: + bilateral swelling of the legs. No chest pain, no palpitations, no orthopnea  Respiratory: +SOB. No exertional dyspnea, no cough, no rhinorrhea, no nasal congestion  GI: no difficulty swallowing, no nausea, no vomiting, no abdominal pain, no diarrhea, no constipation, no melena  : no dysuria, no increased freq, no hematuria, no malodorous urine  Derm: no wounds, no rashes  Heme: no easy bleeding or bruising  MSK: no joint pain, no joint swelling or redness, no extremity pain   Neuro: no headache, no numbness, no weakness, no memory loss  Psych: no depression, no anxiety, no SI

## 2020-06-08 NOTE — PROGRESS NOTE ADULT - ATTENDING COMMENTS
Patient seen and examined with Family Medicine Residents Shalom Holden, Elham Hong and Ketan Whitley on the Family Medicine Teaching Service.  Agree with history, physical, labs and plan which were reviewed in detail after a face to face encounter with the patient.    Patient seen during the COVID-19 Global Pandemic.

## 2020-06-08 NOTE — H&P ADULT - NSICDXPASTMEDICALHX_GEN_ALL_CORE_FT
PAST MEDICAL HISTORY:  Asthma     Mediterranean anemia PAST MEDICAL HISTORY:  Asthma with COPD     History of ventral hernia     Mediterranean anemia

## 2020-06-08 NOTE — H&P ADULT - HISTORY OF PRESENT ILLNESS
64 y/o M with a PMHx of mediterranean anemia, asthma, CAD, incisional hernia came to ED BIB EMS from home for evaluation of SOB, chills and subjective fever. He states today  was at [facility] and started to feel sick. He drove back to home and at approximately 7:30 pm was lying at bed watching TV when suddenly started to have SOB, chills and subjective fever. He  states SOB was no alleviated after the use of  inhalers. SOB improved while was in the ambulance and didn't required oxygen supplementation.  He also reports terry leg swelling that started 1-2 days ago w/o associated pain.He denies cough, tick bite, trauma to the leg, nausea, vomits, dysuria , abdominal pain or any other symptoms. He endorsee his son tested positive for COVID one month ago and his mother  1.5 months ago due to COVID complications. 64 y/o M with a PMHx of mediterranean anemia, asthma, CAD, incisional ventral  hernia came to ED BIB EMS from home for evaluation of SOB, chills and subjective fever. He states today was at [facility] and started to feel sick. He drove back to home and at approximately 7:30 pm was lying at bed watching TV when suddenly started to have SOB, chills and subjective fever. He states SOB was no alleviated after the use of  nhalers. SOB improved while was at ambulance in his way to the hospital and didn't required oxygen supplementation. He also reports terry leg swelling that started 1-2 days ago w/o associated pain. He denies cough, tick bite, trauma to the leg, nausea, vomits, dysuria , abdominal pain or any other symptoms. He endorse his son tested positive for COVID one month ago and his mother  1.5 months ago secondary to COVID19 complications. 64 y/o M with a PMHx of mediterranean anemia, asthma, incisional ventral  hernia came to ED BIB EMS from home for evaluation of SOB, chills and subjective fever. He states today was at [facility] and started to feel sick. He drove back  home and at approximately 7:30 pm was lying at bed watching TV when suddenly started to have SOB, chills and subjective fever. He states SOB was no alleviated after the use of  inhalers. SOB improved while was at ambulance in his way to the hospital and didn't required oxygen supplementation. He also reports terry leg swelling that started 1-2 days ago w/o associated pain. He denies cough, tick bite, trauma to the leg, nausea, vomits, dysuria , abdominal pain or any other symptoms. He endorse his son tested positive for COVID19 one month ago but was not in contact with him and his mother  1.5 months ago secondary to COVID19 complications.

## 2020-06-08 NOTE — PROGRESS NOTE ADULT - ASSESSMENT
66 y/o M with a PMHx of mediterranean anemia, asthma, CAD, incisional ventral  hernia came to ED BIB EMS from home for evaluation of SOB, chills and subjective fever.     # Sepsis secondary to suspected pneumonia   COVID19 infection vs CAP   - 2/4 SIRS criteria: , temp 101.2  - No leukocytosis. WBC 6.43  - Current SpO2 wnl on RA, cont to monitor, Supple O2 PRN Goal of 92%-97%  - UA negative for UTI   - Chest X ray showed a questionable infiltrate of the medial lung base.  - Lactate 3.1 >2.1   - s/p 2.5 L IVF at ED   - R/P lactate 1.5  - COVID 19 PCR negative x1  - F/U UCx and BCx   - Elevated CRP, D dimer, and procalcitonin; LDH and ferritin wnl  - CT chest negative for PNA, scattered pulm nodules (up to 3mm), f/u w/ noncontrast chest CT in 1 year, 2.2 cm left adrenal nodule, likely adenoma  - Cont Levofloxacin 750 mg IV daily   - High suspicion for COVID infxn, will reswab    # Bilateral lower extremity edema   - Pt reported started 1-2 days ago  - No associated trauma or tick bite   - Report frequents road trip to [facility]. Last one was on 6/7/20   - US duplex showed no evidence of DVT   - Elevated the legs     #Elevated D-Dimer   - D dimer 900  - Mostly secondary to current infection but rule out concomitant  PE  - US duplex LE negative for DVT   - V/Q scan; very low probability for PE    #Thrombocytopenia   - Plts 136 on admission, 109 this morning   - Mostly secondary to current infection   - Monitor Plts level     # Elevate troponin  - Trop 0.015>> 0.051>>0.030  - Mostly secondary to demand ischemia in the setting of sepsis   - proBNP 126   - No cardiac hx by patient. Pt  reports 10 year ago had an angioplasty that was negative. He denies current hx of CAD.  - Cardiology consult     # Elevated BP   - No hx of HTN   - -150s  - Monitor BP  - Consider initiation of HTN medication if BP continue elevated     # Elevated blood glucose   - BGs 186 on admission  - A1c 5.6  - no hx of DM  - d/c gc   - f/u am BMP    #Mediterranean anemia (Thalassemia)   - Hgb 11.1   - Baseline 10.8- 12   - No active bleeding   -Trend CBC     # Asthma/ COPD   - Continue Ventolin and albuterol PRN     # Advance directives   - Full code     #DVT ppx  - Lovenox 40mg SQ 64 y/o M with a PMHx of mediterranean anemia, asthma, CAD, incisional ventral  hernia came to ED BIB EMS from home for evaluation of SOB, chills and subjective fever.     # Sepsis secondary to suspected pneumonia   COVID19 infection vs CAP   - 2/4 SIRS criteria: , temp 101.2  - No leukocytosis. WBC 6.43  - Current SpO2 wnl on RA, cont to monitor, Supple O2 PRN Goal of 92%-97%  - UA negative for UTI   - Chest X ray showed a questionable infiltrate of the medial lung base.  - Lactate 3.1 >2.1; s/p 2.5 L IVF at ED   - R/P lactate 1.5  - COVID 19 PCR negative x1  - F/U UCx and BCx   - Elevated CRP, D dimer, and procalcitonin; LDH and ferritin wnl  - CT chest negative for PNA, scattered pulm nodules (up to 3mm), f/u w/ noncontrast chest CT in 1 year, 2.2 cm left adrenal nodule, likely adenoma  - Cont Levofloxacin 750 mg IV daily   - High suspicion for COVID infxn, will reswab  - Pulm consult placed; Dr. Matthew is pt's PCP    # Bilateral lower extremity edema   - Pt reported started 1-2 days ago  - No associated trauma or tick bite   - Report frequents road trip to [facility]. Last one was on 6/7/20   - US duplex showed no evidence of DVT   - Elevated the legs     #Elevated D-Dimer   - D dimer 900  - Mostly secondary to current infection but rule out concomitant  PE  - US duplex LE negative for DVT   - V/Q scan; very low probability for PE    #Thrombocytopenia   - Plts 136 on admission, 109 this morning   - Mostly secondary to current infection   - Monitor Plts level     # Elevate troponin  - Trop 0.015>> 0.051>>0.030  - Mostly secondary to demand ischemia in the setting of sepsis   - proBNP 126   - No cardiac hx by patient. Pt  reports 10 year ago had an angioplasty that was negative. He denies current hx of CAD.  - Cardiology consult     # Elevated BP   - No hx of HTN   - -150s  - Monitor BP  - Consider initiation of HTN medication if BP continue elevated     # Elevated blood glucose   - BGs 186 on admission  - A1c 5.6  - no hx of DM  - d/c gc   - f/u am BMP    #Mediterranean anemia (Thalassemia)   - Hgb 11.1   - Baseline 10.8- 12   - No active bleeding   -Trend CBC     # Asthma/ COPD   - Continue Ventolin and albuterol PRN     # Advance directives   - Full code     #DVT ppx  - Lovenox 40mg SQ

## 2020-06-08 NOTE — PROGRESS NOTE ADULT - SUBJECTIVE AND OBJECTIVE BOX
HPI:  66 y/o M with a PMHx of mediterranean anemia, asthma, incisional ventral  hernia came to ED BIB EMS from home for evaluation of SOB, chills and subjective fever. He states today was at [facility] and started to feel sick. He drove back  home and at approximately 7:30 pm was lying at bed watching TV when suddenly started to have SOB, chills and subjective fever. He states SOB was no alleviated after the use of  inhalers. SOB improved while was at ambulance in his way to the hospital and didn't required oxygen supplementation. He also reports terry leg swelling that started 1-2 days ago w/o associated pain. He denies cough, tick bite, trauma to the leg, nausea, vomits, dysuria , abdominal pain or any other symptoms. He endorse his son tested positive for COVID19 one month ago but was not in contact with him and his mother  1.5 months ago secondary to COVID19 complications. (2020 00:59)    20 Pt evaluated at bedside. Pt c/o fatigue and b/l LE swelling. Headed to CT this morning. Denies fever, Hernández, HA, n/v/d. COVID PCR negative x1, due to high suspicion, will re swab today. Will also cont levofloxacin.      ROS  Gen: no fevers, chills, sweats, weight loss, + fatigue  Cardiovascular: no chest pain, no palpitations, no orthopnea, + leg swelling  Respiratory: + shortness of breath, no cough, no rhinorrhea, no nasal congestion  GI: no difficulty swallowing, no nausea, no vomiting, no abdominal pain, no diarrhea, no constipation, no melana  : no dysuria, no increased freq, no hematuria, no malodorous urine  Derm: no wounds, no rashes  Heme: no easy bleeding or bruising  MSK: no joint pain, no joint swelling or redness, no extremity pain   Neuro: no headache, no numbness, no weakness, no memory loss  Psych: no depression, no anxiety, no SI    MEDICATIONS  (STANDING):  dextrose 5%. 1000 milliLiter(s) (50 mL/Hr) IV Continuous <Continuous>  dextrose 50% Injectable 12.5 Gram(s) IV Push once  dextrose 50% Injectable 25 Gram(s) IV Push once  dextrose 50% Injectable 25 Gram(s) IV Push once  enoxaparin Injectable 40 milliGRAM(s) SubCutaneous daily  insulin lispro (HumaLOG) corrective regimen sliding scale   SubCutaneous three times a day before meals  insulin lispro (HumaLOG) corrective regimen sliding scale   SubCutaneous at bedtime  levoFLOXacin IVPB        MEDICATIONS  (PRN):  acetaminophen   Tablet .. 650 milliGRAM(s) Oral every 6 hours PRN Temp greater or equal to 38C (100.4F), Mild Pain (1 - 3)  ALBUTerol    90 MICROgram(s) HFA Inhaler 1 Puff(s) Inhalation every 4 hours PRN Shortness of Breath and/or Wheezing  dextrose 40% Gel 15 Gram(s) Oral once PRN Blood Glucose LESS THAN 70 milliGRAM(s)/deciliter  glucagon  Injectable 1 milliGRAM(s) IntraMuscular once PRN Glucose LESS THAN 70 milligrams/deciliter  ipratropium 17 MICROgram(s) HFA Inhaler 1 Puff(s) Inhalation every 6 hours PRN SOB    PE:  Vital Signs Last 24 Hrs  T(C): 36.6 (2020 12:03), Max: 38.4 (2020 21:21)  T(F): 97.8 (2020 12:03), Max: 101.2 (2020 21:21)  HR: 76 (2020 12:03) (76 - 128)  BP: 145/69 (2020 12:03) (141/72 - 156/84)  BP(mean): --  RR: 18 (2020 12:03) (18 - 28)  SpO2: 100% (2020 12:03) (95% - 100%)    GEN: NAD, comfortable, resting in bed  NECK: supple, no JVD, no LAD, no thyromegaly  CV: S1S2, RRR, no mumur  RESP: good air movement, CTABL, no rales, rhonchi or wheezing, respirations unlabored  ABD: +BS, soft, ND, NT, no guarding, no rigidity, no HSM, reducible ventral hernia  EXT: 2+ b/l LE edema, +2 radial and pedal pulses, no calve tenderness  MSK: ROM intact in all extremities  NEURO: no focal deficits, AOx3  PSYCH: normal behavior         LABS:                          10.2   8.55  )-----------( 109      ( 2020 03:51 )             33.2     2020 03:51    138    |  109    |  17     ----------------------------<  144    3.7     |  22     |  0.89     Ca    7.8        2020 03:51  Phos  1.8       2020 03:51  Mg     1.7       2020 03:51    TPro  6.9    /  Alb  3.4    /  TBili  1.3    /  DBili  x      /  AST  24     /  ALT  59     /  AlkPhos  65     2020 03:51    LIVER FUNCTIONS - ( 2020 03:51 )  Alb: 3.4 g/dL / Pro: 6.9 gm/dL / ALK PHOS: 65 U/L / ALT: 59 U/L / AST: 24 U/L / GGT: x           PT/INR - ( 2020 21:23 )   PT: 13.3 sec;   INR: 1.19 ratio         PTT - ( 2020 21:23 )  PTT:23.4 sec  CAPILLARY BLOOD GLUCOSE      POCT Blood Glucose.: 152 mg/dL (2020 07:56)    CARDIAC MARKERS ( 2020 03:51 )  0.030 ng/mL / x     / 64 U/L / x     / x      CARDIAC MARKERS ( 2020 00:40 )  0.051 ng/mL / x     / x     / x     / x      CARDIAC MARKERS ( 2020 21:23 )  <0.015 ng/mL / x     / x     / x     / x          Urinalysis Basic - ( 2020 21:23 )    Color: Yellow / Appearance: Clear / S.025 / pH: x  Gluc: x / Ketone: Trace  / Bili: Negative / Urobili: 1 mg/dL   Blood: x / Protein: 30 mg/dL / Nitrite: Negative   Leuk Esterase: Negative / RBC: 3-5 /HPF / WBC Negative   Sq Epi: x / Non Sq Epi: Few / Bacteria: Few        RADIOLOGY:    < from: CT Chest No Cont (20 @ 08:59) >  FINDINGS:    LUNGS AND AIRWAYS: Patent central airways.  Scattered pulmonary nodules measuring up to 3 mm including representative right upper lobe and left lower lobe nodules (series 2 images 60 and 95). Mild bronchial wall thickening. No mass or consolidation.  PLEURA: No pleural effusion.  MEDIASTINUM AND RAGHU: No lymphadenopathy.  VESSELS: Thoracic aorta normal in caliber.  HEART: Heart size isnormal. No pericardial effusion. Blood pool lower in attenuation than the myocardium consistent with anemia.  CHEST WALL AND LOWER NECK: No enlarged axillary lymph nodes.  VISUALIZED UPPER ABDOMEN: Fatty infiltration of the liver. 2.2 cm left adrenalnodule, likely an adenoma.  BONES: Degenerative changes in the spine.    IMPRESSION:   No pneumonia.    Scattered pulmonary nodules measuring up to 3 mm; a follow-up noncontrast chest CT could be obtained in one year.      < from: NM Pulmonary Perfusion Scan (20 @ 11:00) >  IMPRESSION: Very low probability of pulmonary embolus.    < from: US Duplex Venous Lower Ext Complete, Bilateral (20 @ 04:56) >  IMPRESSION:    No deep vein thrombosis in either lower extremity.    < from: Xray Chest 1 View-PORTABLE IMMEDIATE (20 @ 22:01) >  IMPRESSION: Question infiltrate of the medial right lung base.

## 2020-06-08 NOTE — H&P ADULT - ATTENDING COMMENTS
Patient seen and examined after initial evaluation above by family medicine resident. Case discussed and reviewed in detail. Please note my plan below.    66 y/o M with PMH of anemia, asthma, CAD, p/w chills + SOB + subjective fevers.    *Severe sepsis  2/2 possible community-acquired PNA vs r/o COVID19  -CT chest   -Pulse ox monitoring  -Will give levaquin  -F/u COVID19 test  -F/u blood cx  -Isolation precaution  -Lactic acidosis is improving  -Will avoid further aggressive hydration 2/2 possible COVID 19    *SOB - 2/2 asthma exacerbation vs PNA  -Has recent travel w/ LE edema and elevated D-dimer-> U/S of LEs to r/o DVT. Unable to do CTA 2/2 contrast allergy, will get V/Q scan    -C/w albuterol inhaler PRN     *Mildly elevated troponins w/ h/o CAD?  -Likely 2/2 demand ischemia 2/2 severe sepsis / tachycardia  -Trend troponins  -Tele monitoring  -Cardio consult  -ASA avoided due to possible COVID19, but if third set significantly trends up, will consider ASA  -Echo    *Thrombocytopenia  -Trend platelets and if trending down, will need to hold off on lovenox     *H/o anemia  -Trend and if stable -> f/u outpatient for further management     *DVT ppx  -Trend platelets and if trending down, will need to hold off on lovenox. Can switch to SCDs if COVID is negative and V/Q scan / US are also negative

## 2020-06-08 NOTE — H&P ADULT - ASSESSMENT
66 y/o M with a PMHx of mediterranean anemia, asthma, CAD, incisional ventral  hernia came to ED BIB EMS from home for evaluation of SOB, chills and subjective fever.     Admit to med surg    # Sepsis secondary to suspected pneumonia  - R/O COVID 19   - 2/4 SIRS criteria: , temp 101.2  -  No hypoxemia reported during hospital stay  - UA negative for UTI   - Chest X ray showed a mild opacity in the RLL. F/U final lecture   -  Lactate 3.1 >2.1   - s/p 2.5 L IVF at ED   - Monitor lactate level  - F/U UCx and BCx   - No leukocytosis. WBC 6.43  -F/U CRP, D dimer and ferritin   - Monitor vital signs     # Bilateral lower extremity edema   - Pt reported started 1-2 days ago  - No associated trauma or tick bite   - Report frequent [place] Last one was on 6/7/20   - US legs     #Thrombocytopenia   - Plts 136   - Mostly secondary to current infection   - Monitor Plts level     # Elevate troponin  - Trop 0.015>> 0.051   - Mostly secondary to demand ischemia in the setting of sepsis   - Trend troponin   - proBNP 126   - No cardiac hx by patient     # Elevated BP   - No hx of HTN   - -150s  - Monitor BP  - Consider initiation of HTN medication if BP continue elevated     # Elevated blood glucose   - BGs 186   - No hx of DM   - ISS   - Accu checks   - F/U A1c     #Mediterranean anemia ( Thalassemia?)   - Hgb 11.1   - Baseline 10.8- 12   - No active bleeding   - Trend CBC     # Asthma/ COPD   - Continue Ventolin and albuterol PRN     # Advance directives   - Full code     #DVT ppx  - Lovenox 40 mg  subcutaneus     Case discussed with Dr Luu 66 y/o M with a PMHx of mediterranean anemia, asthma, CAD, incisional ventral  hernia came to ED BIB EMS from home for evaluation of SOB, chills and subjective fever.     Admit to med surg    # Sepsis secondary to suspected pneumonia  -  Likely due to COVID19 infection vs CAP    - 2/4 SIRS criteria: , temp 101.2  - No leukocytosis. WBC 6.43  -  No hypoxemia reported   - UA negative for UTI   - Chest X ray showed a questionable infiltrate of the medial lung base.  - Lactate 3.1 >2.1   - s/p 2.5 L IVF at ED   - Monitor lactate level  - f/u COVID 19 PCR   - F/U UCx and BCx   - F/U CRP, D dimer, ferritin , LDH and procalcitonin   - CT of the chest   - Monitor vital signs     # Bilateral lower extremity edema   - Pt reported started 1-2 days ago  - No associated trauma or tick bite   - Report frequents road trip to [facility]. Last one was on 6/7/20   - US legs to rule out DVT   - f/u D Dimer     #Thrombocytopenia   - Plts 136   - Mostly secondary to current infection   - Monitor Plts level     # Elevate troponin  - Trop 0.015>> 0.051   - Mostly secondary to demand ischemia in the setting of sepsis   - Trend troponin   - proBNP 126   - No cardiac hx by patient. Pt  reports 10 year ago ha an angioplasty that was negative. He denies current hx of CAD.  - Cardiology consult   - Consider ECHO for further evaluation     # Elevated BP   - No hx of HTN   - -150s  - Monitor BP  - Consider initiation of HTN medication if BP continue elevated     # Elevated blood glucose   - BGs 186   - No hx of DM   - ISS   - Accu checks   - F/U A1c     #Mediterranean anemia (Thalassemia)   - Hgb 11.1   - Baseline 10.8- 12   - No active bleeding   -Trend CBC     # Asthma/ COPD   - Continue Ventolin and albuterol PRN     # Advance directives   - Full code     #DVT ppx  - Lovenox 40 mg  subcutaneus     Case discussed with Dr Luu 64 y/o M with a PMHx of mediterranean anemia, asthma, CAD, incisional ventral  hernia came to ED BIB EMS from home for evaluation of SOB, chills and subjective fever.     Admit to telemetry     # Sepsis secondary to suspected pneumonia  -  Likely due to COVID19 infection vs CAP    - 2/4 SIRS criteria: , temp 101.2  - No leukocytosis. WBC 6.43  -  No hypoxemia reported   - UA negative for UTI   - Chest X ray showed a questionable infiltrate of the medial lung base.  - Lactate 3.1 >2.1   - s/p 2.5 L IVF at ED   - Monitor lactate level  - f/u COVID 19 PCR   - F/U UCx and BCx   - F/U CRP, D dimer, ferritin , LDH and procalcitonin   - CT of the chest   - Start Levofloxacin 750 mg IV daily   - Monitor vital signs     # Bilateral lower extremity edema   - Pt reported started 1-2 days ago  - No associated trauma or tick bite   - Report frequents road trip to [facility]. Last one was on 6/7/20   - US legs to rule out DVT   - f/u D Dimer     #Tachycardia   - EKG showed sinus tachy with a .   - Mostly secondary to sepsis  - f/u D Dimer and if elevated consider to  proceed with a V/Q scan to rule out PE   - Telemetry     #Thrombocytopenia   - Plts 136   - Mostly secondary to current infection   - Monitor Plts level     # Elevate troponin  - Trop 0.015>> 0.051   - Mostly secondary to demand ischemia in the setting of sepsis   - Trend troponin   - proBNP 126   - No cardiac hx by patient. Pt  reports 10 year ago ha an angioplasty that was negative. He denies current hx of CAD.  - Cardiology consult   - Consider ECHO for further evaluation     # Elevated BP   - No hx of HTN   - -150s  - Monitor BP  - Consider initiation of HTN medication if BP continue elevated     # Elevated blood glucose   - BGs 186   - No hx of DM   - ISS   - Accu checks   - F/U A1c     #Mediterranean anemia (Thalassemia)   - Hgb 11.1   - Baseline 10.8- 12   - No active bleeding   -Trend CBC     # Asthma/ COPD   - Continue Ventolin and albuterol PRN     # Advance directives   - Full code     #DVT ppx  - Lovenox 40 mg  subcutaneus     Case discussed with Dr Luu 64 y/o M with a PMHx of mediterranean anemia, asthma, CAD, incisional ventral  hernia came to ED BIB EMS from home for evaluation of SOB, chills and subjective fever.     Admit to telemetry     # Sepsis secondary to suspected pneumonia  -  Likely due to COVID19 infection vs CAP    - 2/4 SIRS criteria: , temp 101.2  - No leukocytosis. WBC 6.43  -  No hypoxemia reported   - UA negative for UTI   - Chest X ray showed a questionable infiltrate of the medial lung base.  - Lactate 3.1 >2.1   - s/p 2.5 L IVF at ED   - Monitor lactate level  - f/u COVID 19 PCR   - F/U UCx and BCx   - F/U CRP, D dimer, ferritin , LDH and procalcitonin   - CT of the chest   - Start Levofloxacin 750 mg IV daily   - Monitor vital signs     # Bilateral lower extremity edema   - Pt reported started 1-2 days ago  - No associated trauma or tick bite   - Report frequents road trip to [facility]. Last one was on 6/7/20   - US legs to rule out DVT   - f/u D Dimer     #Elevated D-Dimer   - D dimer 900  - Mostly secondary to current infection but rule out concomitant  PE/DVT   - F/U US duplex LE   - V/Q scan at AM     #Thrombocytopenia   - Plts 136   - Mostly secondary to current infection   - Monitor Plts level     # Elevate troponin  - Trop 0.015>> 0.051   - Mostly secondary to demand ischemia in the setting of sepsis   - Trend troponin   - proBNP 126   - No cardiac hx by patient. Pt  reports 10 year ago had an angioplasty that was negative. He denies current hx of CAD.  - Cardiology consult     # Elevated BP   - No hx of HTN   - -150s  - Monitor BP  - Consider initiation of HTN medication if BP continue elevated     # Elevated blood glucose   - BGs 186   - No hx of DM   - ISS   - Accu checks   - F/U A1c     #Mediterranean anemia (Thalassemia)   - Hgb 11.1   - Baseline 10.8- 12   - No active bleeding   -Trend CBC     # Asthma/ COPD   - Continue Ventolin and albuterol PRN     # Advance directives   - Full code     #DVT ppx  - Lovenox 40 mg  subcutaneus     Case discussed with Dr Luu 66 y/o M with a PMHx of mediterranean anemia, asthma, CAD, incisional ventral  hernia came to ED BIB EMS from home for evaluation of SOB, chills and subjective fever.     Admit to telemetry     # Sepsis secondary to suspected pneumonia  -  Likely due to COVID19 infection vs CAP    - 2/4 SIRS criteria: , temp 101.2  - No leukocytosis. WBC 6.43  -  No hypoxemia reported   - UA negative for UTI   - Chest X ray showed a questionable infiltrate of the medial lung base.  - Lactate 3.1 >2.1   - s/p 2.5 L IVF at ED   - Monitor lactate level  - f/u COVID 19 PCR   - F/U UCx and BCx   - F/U CRP, D dimer, ferritin , LDH and procalcitonin   - CT of the chest   - Start Levofloxacin 750 mg IV daily   - Monitor vital signs     # Bilateral lower extremity edema   - Pt reported started 1-2 days ago  - No associated trauma or tick bite   - Report frequents road trip to [facility]. Last one was on 6/7/20   - US duplex showed no evidence of DVT   - Elevated the legs     #Elevated D-Dimer   - D dimer 900  - Mostly secondary to current infection but rule out concomitant  PE  - US duplex LE negative for DVT   - V/Q scan at AM     #Thrombocytopenia   - Plts 136   - Mostly secondary to current infection   - Monitor Plts level     # Elevate troponin  - Trop 0.015>> 0.051   - Mostly secondary to demand ischemia in the setting of sepsis   - Trend troponin   - proBNP 126   - No cardiac hx by patient. Pt  reports 10 year ago had an angioplasty that was negative. He denies current hx of CAD.  - Cardiology consult     # Elevated BP   - No hx of HTN   - -150s  - Monitor BP  - Consider initiation of HTN medication if BP continue elevated     # Elevated blood glucose   - BGs 186   - No hx of DM   - ISS   - Accu checks   - F/U A1c     #Mediterranean anemia (Thalassemia)   - Hgb 11.1   - Baseline 10.8- 12   - No active bleeding   -Trend CBC     # Asthma/ COPD   - Continue Ventolin and albuterol PRN     # Advance directives   - Full code     #DVT ppx  - Lovenox 40 mg  subcutaneus     Case discussed with Dr Luu

## 2020-06-09 ENCOUNTER — TRANSCRIPTION ENCOUNTER (OUTPATIENT)
Age: 65
End: 2020-06-09

## 2020-06-09 VITALS — OXYGEN SATURATION: 99 %

## 2020-06-09 DIAGNOSIS — R79.89 OTHER SPECIFIED ABNORMAL FINDINGS OF BLOOD CHEMISTRY: ICD-10-CM

## 2020-06-09 DIAGNOSIS — A41.9 SEPSIS, UNSPECIFIED ORGANISM: ICD-10-CM

## 2020-06-09 DIAGNOSIS — D56.9 THALASSEMIA, UNSPECIFIED: ICD-10-CM

## 2020-06-09 LAB
ANION GAP SERPL CALC-SCNC: 5 MMOL/L — SIGNIFICANT CHANGE UP (ref 5–17)
BASOPHILS # BLD AUTO: 0.02 K/UL — SIGNIFICANT CHANGE UP (ref 0–0.2)
BASOPHILS NFR BLD AUTO: 0.3 % — SIGNIFICANT CHANGE UP (ref 0–2)
BUN SERPL-MCNC: 14 MG/DL — SIGNIFICANT CHANGE UP (ref 7–23)
CALCIUM SERPL-MCNC: 8.5 MG/DL — SIGNIFICANT CHANGE UP (ref 8.5–10.1)
CHLORIDE SERPL-SCNC: 106 MMOL/L — SIGNIFICANT CHANGE UP (ref 96–108)
CO2 SERPL-SCNC: 27 MMOL/L — SIGNIFICANT CHANGE UP (ref 22–31)
CREAT SERPL-MCNC: 0.86 MG/DL — SIGNIFICANT CHANGE UP (ref 0.5–1.3)
CULTURE RESULTS: SIGNIFICANT CHANGE UP
EOSINOPHIL # BLD AUTO: 0.07 K/UL — SIGNIFICANT CHANGE UP (ref 0–0.5)
EOSINOPHIL NFR BLD AUTO: 1.1 % — SIGNIFICANT CHANGE UP (ref 0–6)
GLUCOSE SERPL-MCNC: 191 MG/DL — HIGH (ref 70–99)
HCT VFR BLD CALC: 32.4 % — LOW (ref 39–50)
HGB BLD-MCNC: 10.1 G/DL — LOW (ref 13–17)
IMM GRANULOCYTES NFR BLD AUTO: 1 % — SIGNIFICANT CHANGE UP (ref 0–1.5)
LYMPHOCYTES # BLD AUTO: 0.47 K/UL — LOW (ref 1–3.3)
LYMPHOCYTES # BLD AUTO: 7.6 % — LOW (ref 13–44)
MCHC RBC-ENTMCNC: 21.8 PG — LOW (ref 27–34)
MCHC RBC-ENTMCNC: 31.2 GM/DL — LOW (ref 32–36)
MCV RBC AUTO: 70 FL — LOW (ref 80–100)
MONOCYTES # BLD AUTO: 0.51 K/UL — SIGNIFICANT CHANGE UP (ref 0–0.9)
MONOCYTES NFR BLD AUTO: 8.2 % — SIGNIFICANT CHANGE UP (ref 2–14)
NEUTROPHILS # BLD AUTO: 5.07 K/UL — SIGNIFICANT CHANGE UP (ref 1.8–7.4)
NEUTROPHILS NFR BLD AUTO: 81.8 % — HIGH (ref 43–77)
PLATELET # BLD AUTO: 96 K/UL — LOW (ref 150–400)
POTASSIUM SERPL-MCNC: 3.5 MMOL/L — SIGNIFICANT CHANGE UP (ref 3.5–5.3)
POTASSIUM SERPL-SCNC: 3.5 MMOL/L — SIGNIFICANT CHANGE UP (ref 3.5–5.3)
RBC # BLD: 4.63 M/UL — SIGNIFICANT CHANGE UP (ref 4.2–5.8)
RBC # FLD: 15.9 % — HIGH (ref 10.3–14.5)
SARS-COV-2 RNA SPEC QL NAA+PROBE: SIGNIFICANT CHANGE UP
SODIUM SERPL-SCNC: 138 MMOL/L — SIGNIFICANT CHANGE UP (ref 135–145)
SPECIMEN SOURCE: SIGNIFICANT CHANGE UP
WBC # BLD: 6.2 K/UL — SIGNIFICANT CHANGE UP (ref 3.8–10.5)
WBC # FLD AUTO: 6.2 K/UL — SIGNIFICANT CHANGE UP (ref 3.8–10.5)

## 2020-06-09 PROCEDURE — 99239 HOSP IP/OBS DSCHRG MGMT >30: CPT | Mod: GC

## 2020-06-09 PROCEDURE — 93308 TTE F-UP OR LMTD: CPT | Mod: 26

## 2020-06-09 NOTE — DISCHARGE NOTE PROVIDER - HOSPITAL COURSE
66 y/o M with a PMHx of mediterranean anemia, asthma, incisional ventral  hernia came to ED BIB EMS from home for evaluation of SOB, chills and subjective fever. He states today was at [facility] and started to feel sick. He drove back  home and at approximately 7:30 pm was lying at bed watching TV when suddenly started to have SOB, chills and subjective fever. He states SOB was no alleviated after the use of  inhalers. SOB improved while was at ambulance in his way to the hospital and didn't required oxygen supplementation. He also reports terry leg swelling that started 1-2 days ago w/o associated pain. He denies cough, tick bite, trauma to the leg, nausea, vomits, dysuria , abdominal pain or any other symptoms. He endorse his son tested positive for COVID19 one month ago but was not in contact with him and his mother  1.5 months ago secondary to COVID19 complications.         COVID negative x1, repeat swab pending        Evaluated by pulmonologist/pcp, ok for d/c home, not likely PNA        Pt evaluated by cardiology. ECHO done but results pending, f/u with cardiologist after discharge. Stress test also recommended as outpaitient.        Lactate re 66 y/o M with a PMHx of mediterranean anemia, asthma, incisional ventral  hernia came to ED BIB EMS from home for evaluation of SOB, chills and subjective fever. He states today was at [facility] and started to feel sick. He drove back home and at approximately 7:30 pm was lying at bed watching TV when suddenly started to have SOB, chills and subjective fever. He states SOB was no alleviated after the use of  inhalers. SOB improved while was at ambulance in his way to the hospital and didn't required oxygen supplementation. Noted to have low grade fever and elevated lactate 2.2, resolved to 1.5 s/p IVF hydration. Blood/urine cx negative. Also with thrombocytopenia of 139K and lymphopenia. D-Dimer elevated at 900 and reports terry leg swelling that started 1-2 days ago w/o associated pain which has since resolved; US LE doppler negative for DVT. V/Q scan low probability for PE. SpO2 >97% on RA at rest and with ambulation, did not require O2 during hospitalization. CT chest was negative for PNA but notable for scattered nodules b/l approx 3mm in size, and 2.2 cm left adrenal nodule, likely adenoma. Pt was evaluated by pulmonologist/PCP; pt ok for discharge home and should f/u as outpatient; pt does not have PNA; f/u non cont CT in 1 yr. Given one dose of levaquin but will not require abx on discharge. Pt evaluated by cardiology for mild elevation of trops 0.051, downtrended and likely due to demand ischemia. ProBNP 129. ECHO done for LV function re-eval; results pending, f/u with cardiologist after discharge. Nuclear stress test can be considered in the future. Hyperglycemia noted; A1C 5.8, f/u with PCP. Elevated BP in 140's also noted; pt should f/u with PCP after discharge.    COVID negative x1, repeat swab pending and will be notified of results. Pt is is hemodynamically stable and medically cleared for discharge home on 6/9/20. Discussed proper self isolation and quarantine precautions if repeat results positive.             6/9/20 Pt evaluated at bedside. No events overnight. Pt afebrile >24 hours. Denies fever, HA, SOB, CP, Hernández, n/v/d, LE swelling, fatigue. R/P covid pending and ECHO results pending; will notify pt of results. D/C home today.        ROS: Negative unless otherwise noted above        Vitals    T(F): 97.2 (06-09-20 @ 10:54), Max: 98 (06-09-20 @ 05:26)    HR: 82 (06-09-20 @ 10:54) (76 - 89)    BP: 149/74 (06-09-20 @ 10:54) (145/69 - 151/87)    RR: 18 (06-09-20 @ 10:54) (18 - 18)    SpO2: 99% (06-09-20 @ 11:40) (98% - 100%)        Physical Exam     Gen: NAD, comfortable    NECK: supple, no JVD, no LAD, no thyromegaly    CV: RRR, nl s1/s2, no M/R/G    Pulm: nl respiratory effort, CTABL, no wheezes/crackles/rhonchi    Abd: normoactive bowel sounds in all 4 quadrants, soft, nontender, nondistended, no rebound, no guarding, no masses    Extremities: no pedal edema, pedal pulses palpable     Skin: nl warm and dry, no wounds     Neuro: A&Ox3, no focal deficits    Psych: no depression, no SI, no HI

## 2020-06-09 NOTE — CONSULT NOTE ADULT - SUBJECTIVE AND OBJECTIVE BOX
CHIEF COMPLAINT:  Patient is a 65y old  Male who presents with a chief complaint of SOB, fever (2020 14:22)      HPI:  20:  66 y/o M last seen by me in  with a PMHx of mediterranean anemia, asthma, incisional ventral  hernia came to ED BIB EMS from home for evaluation of SOB, chills and subjective fever. He states today was at [facility] and started to feel sick. He drove back  home and at approximately 7:30 pm was lying at bed watching TV when suddenly started to have SOB, chills and subjective fever. He states SOB was no alleviated after the use of  inhalers. SOB improved while was at ambulance in his way to the hospital and didn't required oxygen supplementation. He also reports terry leg swelling that started 1-2 days ago w/o associated pain. He denies cough, tick bite, trauma to the leg, nausea, vomits, dysuria , abdominal pain or any other symptoms. He endorse his son tested positive for COVID19 one month ago but was not in contact with him and his mother  1.5 months ago secondary to COVID19 complications. Pt was admitted and had slight bump in Troponin levels but no anginal chest pains or other cardiac symptoms.  He had a cardiac cath at [facility] by Dr Joey Erwin on 2010 showing normal coronary arteries.        PMHx:  PAST MEDICAL & SURGICAL HISTORY:  History of ventral hernia  Asthma with COPD  Mediterranean anemia  S/P colostomy: 20 years ago  Cardiac cath-Dr Erwin-[facility] Hosp: 2010-normal coronary arteries.      FAMILY HISTORY:   FAMILY HISTORY:  FH: senile dementia: Mother-She  1.5 months ago due to COVID19 complications      ALLERGIES:  Allergies  Augmentin (Unknown)  iodinated radiocontrast agents (Anaphylaxis)  latex (Anaphylaxis)        REVIEW OF SYSTEMS:    CONSTITUTIONAL: No weakness, fevers or chills  EYES/ENT: No visual changes;  No vertigo or throat pain   NECK: No pain or stiffness  RESPIRATORY: No cough, wheezing, hemoptysis; No shortness of breath  CARDIOVASCULAR: No chest pain or palpitations  GASTROINTESTINAL: No abdominal or epigastric pain. No nausea, vomiting, or hematemesis; No diarrhea or constipation. No melena or hematochezia.  GENITOURINARY: No dysuria, frequency or hematuria  NEUROLOGICAL: No numbness or weakness  SKIN: No itching, burning, rashes, or lesions   All other review of systems is negative unless indicated above    Vital Signs Last 24 Hrs  T(C): 36.7 (2020 05:26), Max: 36.7 (2020 05:26)  T(F): 98 (2020 05:26), Max: 98 (2020 05:26)  HR: 86 (2020 05:26) (76 - 89)  BP: 151/87 (2020 05:26) (145/69 - 151/87)  BP(mean): --  RR: 18 (2020 05:26) (18 - 18)  SpO2: 98% (2020 05:26) (98% - 100%)      CAPILLARY BLOOD GLUCOSE  POCT Blood Glucose.: 152 mg/dL (2020 07:56)      PHYSICAL EXAM:   Constitutional: NAD, awake and alert, well-developed  HEENT: PERR, EOMI, Normal Hearing, MMM  Neck: Soft and supple, No LAD, No JVD  Respiratory: Breath sounds are clear bilaterally, No wheezing, rales or rhonchi  Cardiovascular: S1 and S2, regular rate and rhythm, soft DIMITRIS at LLSB and base as before, no gallops or rubs  Gastrointestinal: Bowel Sounds present, soft, nontender, nondistended, no guarding, no rebound  Extremities: No peripheral edema  Vascular: 2+ peripheral pulses  Neurological: A/O x 3, no focal deficits  Musculoskeletal: 5/5 strength b/l upper and lower extremities  Skin: No rashes      MEDICATIONS  (STANDING):  dextrose 5%. 1000 milliLiter(s) (50 mL/Hr) IV Continuous <Continuous>  dextrose 50% Injectable 12.5 Gram(s) IV Push once  dextrose 50% Injectable 25 Gram(s) IV Push once  dextrose 50% Injectable 25 Gram(s) IV Push once  enoxaparin Injectable 40 milliGRAM(s) SubCutaneous daily  insulin lispro (HumaLOG) corrective regimen sliding scale   SubCutaneous at bedtime  levoFLOXacin IVPB 750 milliGRAM(s) IV Intermittent every 24 hours  levoFLOXacin IVPB          LABS: All Labs Reviewed:                        10.2   8.55  )-----------( 109      ( 2020 03:51 )             33.2     06-08    138  |  109<H>  |  17  ----------------------------<  144<H>  3.7   |  22  |  0.89    Ca    7.8<L>      2020 03:51  Phos  1.8     06-08  Mg     1.7     06-08    TPro  6.9  /  Alb  3.4  /  TBili  1.3<H>  /  DBili  x   /  AST  24  /  ALT  59  /  AlkPhos  65  06-08    PT/INR - ( 2020 21:23 )   PT: 13.3 sec;   INR: 1.19 ratio         PTT - ( 2020 21:23 )  PTT:23.4 sec  CARDIAC MARKERS ( 2020 03:51 )  0.030 ng/mL / x     / 64 U/L / x     / x      CARDIAC MARKERS ( 2020 00:40 )  0.051 ng/mL / x     / x     / x     / x      CARDIAC MARKERS ( 2020 21:23 )  <0.015 ng/mL / x     / x     / x     / x          Serum Pro-Brain Natriuretic Peptide: 129 pg/mL (07 @ 21:23)    BLOOD CULTURES:   LIPID PROFILE     RADIOLOGY:    CT of Chest: 20:  FINDINGS:  LUNGS AND AIRWAYS: Patent central airways.  Scattered pulmonary nodules measuring up to 3 mm including representative right upper lobe and left lower lobe nodules (series 2 images 60 and 95). Mild bronchial wall thickening. No mass or consolidation.  PLEURA: No pleural effusion.  MEDIASTINUM AND RAGHU: No lymphadenopathy.  VESSELS: Thoracic aorta normal in caliber.  HEART: Heart size is normal. No pericardial effusion. Blood pool lower in attenuation than the myocardium consistent with anemia.  CHEST WALL AND LOWER NECK: No enlarged axillary lymph nodes.  VISUALIZED UPPER ABDOMEN: Fatty infiltration of the liver. 2.2 cm left adrenal nodule, likely an adenoma.  BONES: Degenerative changes in the spine.  IMPRESSION:   No pneumonia.  Scattered pulmonary nodules measuring up to 3 mm; a follow-up noncontrast chest CT could be obtained in one year.    CXR: 20:  FINDINGS: The cardiac silhouette is magnified by AP portable technique. Patchy opacity of the medial right lung base is seen question infiltrate/atelectasis versus pericardial fat pad. Remainder lung fields are clear. Negative for pneumothorax or significant pleural effusion. Bony thorax intact.  IMPRESSION: Question infiltrate of the medial right lung base.    Nuclear V/Q Lung Scan: 20:  FINDINGS: The study demonstrates mildy heterogenous tracer distribution in both lungs. There are no segmental perfusion defects.  IMPRESSION: Very low probability of pulmonary embolus.    Venous Doppler U/S of Legs: 20:  FINDINGS: There is no thrombosis in bilateral common femoral veins, femoral veins or popliteal veins. Visualized calf veins are patent.  IMPRESSION:  No deep vein thrombosis in either lower extremity.      EKG:      TELEMETRY:      ECHO: CHIEF COMPLAINT:  Patient is a 65y old  Male who presents with a chief complaint of SOB, fever (2020 14:22)      HPI:  20:  66 y/o M last seen by me in  with a PMHx of mediterranean anemia, asthma, incisional ventral  hernia came to ED BIB EMS from home for evaluation of SOB, chills and subjective fever. He states today was at [facility] and started to feel sick. He drove back  home and at approximately 7:30 pm was lying at bed watching TV when suddenly started to have SOB, chills and subjective fever. He states SOB was no alleviated after the use of  inhalers. SOB improved while was at ambulance in his way to the hospital and didn't required oxygen supplementation. He also reports terry leg swelling that started 1-2 days ago w/o associated pain. He denies cough, tick bite, trauma to the leg, nausea, vomits, dysuria , abdominal pain or any other symptoms. He endorse his son tested positive for COVID19 one month ago but was not in contact with him and his mother  1.5 months ago secondary to COVID19 complications. Pt was admitted and had slight bump in Troponin levels but no anginal chest pains or other cardiac symptoms.  He had a cardiac cath at [facility] by Dr Joey Erwin on 2010 showing normal coronary arteries.  He's was swabbed COVID-19 (-) once so far.  He is eager to go home.        PMHx:  PAST MEDICAL & SURGICAL HISTORY:  History of ventral hernia  Asthma with COPD  Mediterranean anemia  S/P colostomy: 20 years ago  Cardiac cath-Dr Erwin-[facility] Hosp: 2010-normal coronary arteries.      FAMILY HISTORY:   FAMILY HISTORY:  FH: senile dementia: Mother-She  1.5 months ago due to COVID19 complications      ALLERGIES:  Allergies  Augmentin (Unknown)  iodinated radiocontrast agents (Anaphylaxis)  latex (Anaphylaxis)        REVIEW OF SYSTEMS:    CONSTITUTIONAL: No weakness, fevers or chills  EYES/ENT: No visual changes;  No vertigo or throat pain   NECK: No pain or stiffness  RESPIRATORY: No cough, wheezing, hemoptysis; No shortness of breath  CARDIOVASCULAR: No chest pain or palpitations  GASTROINTESTINAL: No abdominal or epigastric pain. No nausea, vomiting, or hematemesis; No diarrhea or constipation. No melena or hematochezia.  GENITOURINARY: No dysuria, frequency or hematuria  NEUROLOGICAL: No numbness or weakness  SKIN: No itching, burning, rashes, or lesions   All other review of systems is negative unless indicated above    Vital Signs Last 24 Hrs  T(C): 36.7 (2020 05:26), Max: 36.7 (2020 05:26)  T(F): 98 (2020 05:26), Max: 98 (2020 05:26)  HR: 86 (2020 05:26) (76 - 89)  BP: 151/87 (2020 05:26) (145/69 - 151/87)  BP(mean): --  RR: 18 (2020 05:26) (18 - 18)  SpO2: 98% (2020 05:26) (98% - 100%)      CAPILLARY BLOOD GLUCOSE  POCT Blood Glucose.: 152 mg/dL (2020 07:56)      PHYSICAL EXAM:   Constitutional: NAD, awake and alert, well-developed  HEENT: PERR, EOMI, Normal Hearing, MMM  Neck: Soft and supple, No LAD, No JVD  Respiratory: Breath sounds are clear bilaterally, No wheezing, rales or rhonchi  Cardiovascular: S1 and S2, regular rate and rhythm, soft DIMITRIS at LLSB and base as before, no gallops or rubs  Gastrointestinal: Bowel Sounds present, soft, nontender, nondistended, no guarding, no rebound  Extremities: No peripheral edema  Vascular: 2+ peripheral pulses  Neurological: A/O x 3, no focal deficits  Musculoskeletal: 5/5 strength b/l upper and lower extremities  Skin: No rashes      MEDICATIONS  (STANDING):  dextrose 5%. 1000 milliLiter(s) (50 mL/Hr) IV Continuous <Continuous>  dextrose 50% Injectable 12.5 Gram(s) IV Push once  dextrose 50% Injectable 25 Gram(s) IV Push once  dextrose 50% Injectable 25 Gram(s) IV Push once  enoxaparin Injectable 40 milliGRAM(s) SubCutaneous daily  insulin lispro (HumaLOG) corrective regimen sliding scale   SubCutaneous at bedtime  levoFLOXacin IVPB 750 milliGRAM(s) IV Intermittent every 24 hours  levoFLOXacin IVPB          LABS: All Labs Reviewed:    COVID-19 PCR: NotDetec:                         10.2   8.55  )-----------( 109      ( 2020 03:51 )             33.2     06-08    138  |  109<H>  |  17  ----------------------------<  144<H>  3.7   |  22  |  0.89    Ca    7.8<L>      2020 03:51  Phos  1.8     06-08  Mg     1.7     06-08    TPro  6.9  /  Alb  3.4  /  TBili  1.3<H>  /  DBili  x   /  AST  24  /  ALT  59  /  AlkPhos  65  06-08    PT/INR - ( 2020 21:23 )   PT: 13.3 sec;   INR: 1.19 ratio         PTT - ( 2020 21:23 )  PTT:23.4 sec  CARDIAC MARKERS ( 2020 03:51 )  0.030 ng/mL / x     / 64 U/L / x     / x      CARDIAC MARKERS ( 2020 00:40 )  0.051 ng/mL / x     / x     / x     / x      CARDIAC MARKERS ( 2020 21:23 )  <0.015 ng/mL / x     / x     / x     / x          Serum Pro-Brain Natriuretic Peptide: 129 pg/mL (06-07 @ 21:23)    BLOOD CULTURES:   LIPID PROFILE     RADIOLOGY:    CT of Chest: 20:  FINDINGS:  LUNGS AND AIRWAYS: Patent central airways.  Scattered pulmonary nodules measuring up to 3 mm including representative right upper lobe and left lower lobe nodules (series 2 images 60 and 95). Mild bronchial wall thickening. No mass or consolidation.  PLEURA: No pleural effusion.  MEDIASTINUM AND RAGHU: No lymphadenopathy.  VESSELS: Thoracic aorta normal in caliber.  HEART: Heart size is normal. No pericardial effusion. Blood pool lower in attenuation than the myocardium consistent with anemia.  CHEST WALL AND LOWER NECK: No enlarged axillary lymph nodes.  VISUALIZED UPPER ABDOMEN: Fatty infiltration of the liver. 2.2 cm left adrenal nodule, likely an adenoma.  BONES: Degenerative changes in the spine.  IMPRESSION:   No pneumonia.  Scattered pulmonary nodules measuring up to 3 mm; a follow-up noncontrast chest CT could be obtained in one year.    CXR: 20:  FINDINGS: The cardiac silhouette is magnified by AP portable technique. Patchy opacity of the medial right lung base is seen question infiltrate/atelectasis versus pericardial fat pad. Remainder lung fields are clear. Negative for pneumothorax or significant pleural effusion. Bony thorax intact.  IMPRESSION: Question infiltrate of the medial right lung base.    Nuclear V/Q Lung Scan: 20:  FINDINGS: The study demonstrates mildy heterogenous tracer distribution in both lungs. There are no segmental perfusion defects.  IMPRESSION: Very low probability of pulmonary embolus.    Venous Doppler U/S of Legs: 20:  FINDINGS: There is no thrombosis in bilateral common femoral veins, femoral veins or popliteal veins. Visualized calf veins are patent.  IMPRESSION:  No deep vein thrombosis in either lower extremity.      EKG:      TELEMETRY:      ECHO:

## 2020-06-09 NOTE — DISCHARGE NOTE PROVIDER - CARE PROVIDER_API CALL
Kareem Matthew  INTERNAL MEDICINE  [address] [place] [address]  Phone: [phone]  Fax: [phone]  Established Patient  Follow Up Time: 1-3 days    Grover Glez  CARDIOVASCULAR DISEASE  [address] [place] [address]  Phone: [phone]  Fax: [phone]  Follow Up Time:

## 2020-06-09 NOTE — DISCHARGE NOTE NURSING/CASE MANAGEMENT/SOCIAL WORK - PATIENT PORTAL LINK FT
You can access the FollowMyHealth Patient Portal offered by [facility] by registering at the following website: [email or web address]. By joining [employer]’s FollowMyHealth portal, you will also be able to view your health information using other applications (apps) compatible with our system.

## 2020-06-09 NOTE — DISCHARGE NOTE PROVIDER - NSDCCPCAREPLAN_GEN_ALL_CORE_FT
PRINCIPAL DISCHARGE DIAGNOSIS  Diagnosis: Sepsis  Assessment and Plan of Treatment: You were admitted with fever and also noted to have an elevated lactate  COVID 19 test was negative, repeat test is pending and we will follow up with you with the results.   Imaging was negative for acute pneumonia and blood/urine cultures were negative  Your elevated lactate and fever resolved, you have been afebrile for over 24 hours  You will not need antibiotics after discharge   Your symptoms have resolved and you are stable for discharge home.  Please follow up with your primary care physician for continued monitoring  Please return to the ED if you are having worsening fevers or trouble breathing      SECONDARY DISCHARGE DIAGNOSES  Diagnosis: Elevated troponin  Assessment and Plan of Treatment: You had a mild elevation of troponins which decreased during hospitalization  You were seen by cardiology; elevation due to demand ischemia  ECHO was perfromed to evaluate your heart function; results are pending and can follow up with cardiology as outpatient, who will also consider a stress test in the future.    Diagnosis: Pulmonary nodule  Assessment and Plan of Treatment: CT Chest was notable for small lung nodules bilaterally.  Follow up with your primary care provider after discharge for continued monitoring    Diagnosis: Hyperglycemia  Assessment and Plan of Treatment: Your blood sugar was elevated during hospitalization   Your hemoglobin A1C was 5.6  Please follow up with your PCP for continued monitoring    Diagnosis: Thrombocytopenia  Assessment and Plan of Treatment: Your platelet count was low 136K but you are without signs of bleeding  Follow up with your primary care provider for continued monitoring    Diagnosis: Elevated d-dimer  Assessment and Plan of Treatment: D-Dimer was elevated at 900  ultrasound of the legs was negative for clots  V/Q scan of the lungs showed low-probability for pulmonary embolism  Follow up with your primary care provider after discharge for continued monitoring    Diagnosis: Asthma  Assessment and Plan of Treatment: with COPD  Currently stable  Your oxygenation has been normal on room air at rest and with ambulation  Follow up with your primary care provider for continued monitoring PRINCIPAL DISCHARGE DIAGNOSIS  Diagnosis: Sepsis  Assessment and Plan of Treatment: You were admitted with fever and also noted to have an elevated lactate  COVID 19 test was negative, repeat test is pending and we will follow up with you with the results. If results positive, we will notify you and send additional medications to be taken. Proper quarantine measures discussed prior to discharge.   Imaging was negative for acute pneumonia and blood/urine cultures were negative  Your elevated lactate and fever resolved, you have been afebrile for over 24 hours  You will not need antibiotics after discharge   Your symptoms have resolved and you are stable for discharge home.  Please follow up with your primary care physician for continued monitoring  Please return to the ED if you are having worsening fevers or trouble breathing      SECONDARY DISCHARGE DIAGNOSES  Diagnosis: Elevated troponin  Assessment and Plan of Treatment: You had a mild elevation of troponins which decreased during hospitalization  You were seen by cardiology; elevation due to demand ischemia  ECHO was perfromed to evaluate your heart function; results are pending and can follow up with cardiology as outpatient, who will also consider a stress test in the future.    Diagnosis: Pulmonary nodule  Assessment and Plan of Treatment: CT Chest was notable for small lung nodules bilaterally.  Follow up with your primary care provider after discharge for continued monitoring    Diagnosis: Hyperglycemia  Assessment and Plan of Treatment: Your blood sugar was elevated during hospitalization   Your hemoglobin A1C was 5.6  Please follow up with your PCP for continued monitoring    Diagnosis: Thrombocytopenia  Assessment and Plan of Treatment: Your platelet count was low 136K but you are without signs of bleeding  Follow up with your primary care provider for continued monitoring    Diagnosis: Elevated d-dimer  Assessment and Plan of Treatment: D-Dimer was elevated at 900  ultrasound of the legs was negative for clots  V/Q scan of the lungs showed low-probability for pulmonary embolism  Follow up with your primary care provider after discharge for continued monitoring    Diagnosis: Asthma  Assessment and Plan of Treatment: with COPD  Currently stable  Your oxygenation has been normal on room air at rest and with ambulation  Follow up with your primary care provider for continued monitoring PRINCIPAL DISCHARGE DIAGNOSIS  Diagnosis: Sepsis  Assessment and Plan of Treatment: You were admitted with fever and also noted to have an elevated lactate  COVID 19 test was negative, repeat test is pending and we will follow up with you with the results. If results positive, we will notify you and send additional medications to be taken. Proper quarantine measures discussed prior to discharge.   Imaging was negative for acute pneumonia and blood/urine cultures were negative  Your elevated lactate and fever resolved, you have been afebrile for over 24 hours  You will not need antibiotics after discharge   Your symptoms have resolved and you are stable for discharge home.  Please follow up with your primary care physician for continued monitoring  Please return to the ED if you are having worsening fevers or trouble breathing      SECONDARY DISCHARGE DIAGNOSES  Diagnosis: Elevated troponin  Assessment and Plan of Treatment: You had a mild elevation of troponins which decreased during hospitalization  You were seen by cardiology; elevation due to demand ischemia  ECHO was perfromed to evaluate your heart function; results are pending and can follow up with cardiology as outpatient, who will also consider a stress test in the future.    Diagnosis: Pulmonary nodule  Assessment and Plan of Treatment: CT Chest was notable for small lung nodules bilaterally.  Follow up with your primary care provider after discharge for continued monitoring    Diagnosis: Hyperglycemia  Assessment and Plan of Treatment: Your blood sugar was elevated during hospitalization   Your hemoglobin A1C was 5.6  Please follow up with your PCP for continued monitoring    Diagnosis: Thrombocytopenia  Assessment and Plan of Treatment: Your platelet count was low 136K but you are without signs of bleeding  Follow up with your primary care provider for continued monitoring    Diagnosis: Elevated d-dimer  Assessment and Plan of Treatment: D-Dimer was elevated at 900  ultrasound of the legs was negative for clots  V/Q scan of the lungs showed low-probability for pulmonary embolism  Follow up with your primary care provider after discharge for continued monitoring    Diagnosis: Asthma  Assessment and Plan of Treatment: with COPD  Currently stable  Your oxygenation has been normal on room air at rest and with ambulation  Follow up with your primary care provider for continued monitoring    Diagnosis: Elevated blood pressure reading  Assessment and Plan of Treatment: Your blood pressure was noted to be elevated  Please follow up with your PCP for evaluation and medications if needed

## 2020-06-09 NOTE — CONSULT NOTE ADULT - ASSESSMENT
6/9/20:  Pt with above history and probable sepsis with likely demand ischemia.  No indication for acute cardiac cath at this time.  Currently COVID-19 (-) and clinically stable from a cardiac standpoint so far.  Will get an echo to re-evaluate his LV function.  Continue as outlined by medicine etal.  Will consider a nuclear stress test in the future, not needed at this time.  Will follow as work-up and treatment progresses. 6/9/20:  Pt with above history and probable sepsis with likely demand ischemia.  No indication for acute cardiac cath at this time.  Currently COVID-19 (-) and clinically stable from a cardiac standpoint so far.  An echo to re-evaluate his LV function can be done in my office if discharged before.  Continue as outlined by medicine etal.  Will consider a nuclear stress test in the future, not needed at this time.  Will follow as work-up and treatment progresses.

## 2020-06-09 NOTE — DISCHARGE NOTE PROVIDER - PROVIDER TOKENS
PROVIDER:[TOKEN:[[id number]],FOLLOWUP:[1-3 days],ESTABLISHEDPATIENT:[T]],PROVIDER:[TOKEN:[[id number]]]

## 2020-06-10 PROBLEM — J44.9 CHRONIC OBSTRUCTIVE PULMONARY DISEASE, UNSPECIFIED: Chronic | Status: ACTIVE | Noted: 2020-06-08

## 2020-06-10 PROBLEM — Z87.19 PERSONAL HISTORY OF OTHER DISEASES OF THE DIGESTIVE SYSTEM: Chronic | Status: ACTIVE | Noted: 2020-06-08

## 2020-06-11 ENCOUNTER — APPOINTMENT (OUTPATIENT)
Dept: INTERNAL MEDICINE | Facility: CLINIC | Age: 65
End: 2020-06-11
Payer: COMMERCIAL

## 2020-06-11 VITALS
BODY MASS INDEX: 33.93 KG/M2 | OXYGEN SATURATION: 96 % | WEIGHT: 256 LBS | SYSTOLIC BLOOD PRESSURE: 142 MMHG | DIASTOLIC BLOOD PRESSURE: 82 MMHG | RESPIRATION RATE: 18 BRPM | HEART RATE: 87 BPM | HEIGHT: 73 IN | TEMPERATURE: 98.5 F

## 2020-06-11 DIAGNOSIS — M79.606 PAIN IN LEG, UNSPECIFIED: ICD-10-CM

## 2020-06-11 DIAGNOSIS — S06.0X0A CONCUSSION W/OUT LOSS OF CONSCIOUSNESS, INITIAL ENCOUNTER: ICD-10-CM

## 2020-06-11 DIAGNOSIS — S76.111A STRAIN OF RIGHT QUADRICEPS MUSCLE, FASCIA AND TENDON, INITIAL ENCOUNTER: ICD-10-CM

## 2020-06-11 DIAGNOSIS — Z87.39 PERSONAL HISTORY OF OTHER DISEASES OF THE MUSCULOSKELETAL SYSTEM AND CONNECTIVE TISSUE: ICD-10-CM

## 2020-06-11 DIAGNOSIS — Z87.898 PERSONAL HISTORY OF OTHER SPECIFIED CONDITIONS: ICD-10-CM

## 2020-06-11 DIAGNOSIS — Z91.81 HISTORY OF FALLING: ICD-10-CM

## 2020-06-11 DIAGNOSIS — G44.319 ACUTE POST-TRAUMATIC HEADACHE, NOT INTRACTABLE: ICD-10-CM

## 2020-06-11 DIAGNOSIS — R07.89 OTHER CHEST PAIN: ICD-10-CM

## 2020-06-11 DIAGNOSIS — R20.2 PARESTHESIA OF SKIN: ICD-10-CM

## 2020-06-11 DIAGNOSIS — S13.9XXA SPRAIN OF JOINTS AND LIGAMENTS OF UNSPECIFIED PARTS OF NECK, INITIAL ENCOUNTER: ICD-10-CM

## 2020-06-11 DIAGNOSIS — J45.909 UNSPECIFIED ASTHMA, UNCOMPLICATED: ICD-10-CM

## 2020-06-11 DIAGNOSIS — L91.8 OTHER HYPERTROPHIC DISORDERS OF THE SKIN: ICD-10-CM

## 2020-06-11 DIAGNOSIS — M79.651 PAIN IN RIGHT THIGH: ICD-10-CM

## 2020-06-11 DIAGNOSIS — Z86.69 PERSONAL HISTORY OF OTHER DISEASES OF THE NERVOUS SYSTEM AND SENSE ORGANS: ICD-10-CM

## 2020-06-11 DIAGNOSIS — R05 COUGH: ICD-10-CM

## 2020-06-11 DIAGNOSIS — J06.9 ACUTE UPPER RESPIRATORY INFECTION, UNSPECIFIED: ICD-10-CM

## 2020-06-11 PROCEDURE — 99496 TRANSJ CARE MGMT HIGH F2F 7D: CPT

## 2020-06-11 RX ORDER — CELECOXIB 200 MG/1
200 CAPSULE ORAL TWICE DAILY
Qty: 60 | Refills: 0 | Status: DISCONTINUED | COMMUNITY
Start: 2020-05-22 | End: 2020-06-11

## 2020-06-11 RX ORDER — LORAZEPAM 1 MG/1
1 TABLET ORAL
Qty: 2 | Refills: 0 | Status: DISCONTINUED | COMMUNITY
Start: 2020-05-13 | End: 2020-06-11

## 2020-06-11 NOTE — HISTORY OF PRESENT ILLNESS
[FreeTextEntry1] : Hospital followup [de-identified] : Mr. Ronquillo presents for hospital followup. He presented to [facility] emergency room on 6/8/20 with shortness of breath from the previous night. He had no associated chest pain or palpitations. He denied any fevers or chills. In the emergency room the patient had a noncontrast CT scan of the chest which showed scattered 2 mm pulmonary nodules. He has an allergy to contrast dye. Patient was also given a ventilation perfusion lung scan which was low probability for pulmonary embolism. Cardiac enzymes were negative.

## 2020-06-11 NOTE — PLAN
[FreeTextEntry1] : Mr. Ronquillo presents for a hospital followup evaluation. CT scan of the chest apparently showed 2-3 mm pulmonary nodules, however, I did not observe any on inspection. He does receive yearly CAT scan of the chest at [facility] as part of the 9/11 surveillance program. Mr. Ronquillo has been given a prescription for comprehensive blood profile. He uses albuterol on an as-needed basis. Advised him to lose weight through lifestyle modification with diet and exercise. He will followup in 6 months.

## 2020-06-11 NOTE — PHYSICAL EXAM
[No Acute Distress] : no acute distress [Well Nourished] : well nourished [Well Developed] : well developed [Normal Sclera/Conjunctiva] : normal sclera/conjunctiva [Well-Appearing] : well-appearing [PERRL] : pupils equal round and reactive to light [EOMI] : extraocular movements intact [Normal Outer Ear/Nose] : the outer ears and nose were normal in appearance [Normal Oropharynx] : the oropharynx was normal [No JVD] : no jugular venous distention [No Lymphadenopathy] : no lymphadenopathy [Supple] : supple [Thyroid Normal, No Nodules] : the thyroid was normal and there were no nodules present [No Respiratory Distress] : no respiratory distress  [No Accessory Muscle Use] : no accessory muscle use [Normal Rate] : normal rate  [Clear to Auscultation] : lungs were clear to auscultation bilaterally [Regular Rhythm] : with a regular rhythm [Normal S1, S2] : normal S1 and S2 [No Murmur] : no murmur heard [No Carotid Bruits] : no carotid bruits [No Abdominal Bruit] : a ~M bruit was not heard ~T in the abdomen [No Varicosities] : no varicosities [Pedal Pulses Present] : the pedal pulses are present [No Edema] : there was no peripheral edema [No Palpable Aorta] : no palpable aorta [No Extremity Clubbing/Cyanosis] : no extremity clubbing/cyanosis [Soft] : abdomen soft [Non Tender] : non-tender [No Masses] : no abdominal mass palpated [Non-distended] : non-distended [Normal Bowel Sounds] : normal bowel sounds [No HSM] : no HSM [Normal Anterior Cervical Nodes] : no anterior cervical lymphadenopathy [Normal Posterior Cervical Nodes] : no posterior cervical lymphadenopathy [No CVA Tenderness] : no CVA  tenderness [No Spinal Tenderness] : no spinal tenderness [No Joint Swelling] : no joint swelling [Coordination Grossly Intact] : coordination grossly intact [Grossly Normal Strength/Tone] : grossly normal strength/tone [No Rash] : no rash [No Focal Deficits] : no focal deficits [Normal Gait] : normal gait [Deep Tendon Reflexes (DTR)] : deep tendon reflexes were 2+ and symmetric [Normal Affect] : the affect was normal [de-identified] : Incisional ventral hernia [Normal Insight/Judgement] : insight and judgment were intact

## 2020-06-13 LAB
CULTURE RESULTS: SIGNIFICANT CHANGE UP
CULTURE RESULTS: SIGNIFICANT CHANGE UP
SPECIMEN SOURCE: SIGNIFICANT CHANGE UP
SPECIMEN SOURCE: SIGNIFICANT CHANGE UP

## 2020-06-24 NOTE — CHART NOTE - NSCHARTNOTEFT_GEN_A_CORE
Patient admitted with sespsis secondary to pneumonia. 3 SIRS criteria met with elevated heart rate at 117 and increased respiratory rate of 22 and temperature of 101.2.

## 2020-09-01 ENCOUNTER — APPOINTMENT (OUTPATIENT)
Dept: MRI IMAGING | Facility: CLINIC | Age: 65
End: 2020-09-01
Payer: COMMERCIAL

## 2020-09-01 ENCOUNTER — OUTPATIENT (OUTPATIENT)
Dept: OUTPATIENT SERVICES | Facility: HOSPITAL | Age: 65
LOS: 1 days | End: 2020-09-01
Payer: COMMERCIAL

## 2020-09-01 DIAGNOSIS — Z93.3 COLOSTOMY STATUS: Chronic | ICD-10-CM

## 2020-09-01 DIAGNOSIS — Z00.8 ENCOUNTER FOR OTHER GENERAL EXAMINATION: ICD-10-CM

## 2020-09-01 PROCEDURE — 72148 MRI LUMBAR SPINE W/O DYE: CPT | Mod: 26

## 2020-09-01 PROCEDURE — 72141 MRI NECK SPINE W/O DYE: CPT

## 2020-09-01 PROCEDURE — 72141 MRI NECK SPINE W/O DYE: CPT | Mod: 26

## 2020-09-01 PROCEDURE — 72148 MRI LUMBAR SPINE W/O DYE: CPT

## 2020-09-15 ENCOUNTER — APPOINTMENT (OUTPATIENT)
Dept: NEUROSURGERY | Facility: CLINIC | Age: 65
End: 2020-09-15
Payer: COMMERCIAL

## 2020-09-15 VITALS
SYSTOLIC BLOOD PRESSURE: 157 MMHG | TEMPERATURE: 97.5 F | HEART RATE: 75 BPM | DIASTOLIC BLOOD PRESSURE: 88 MMHG | HEIGHT: 73 IN | BODY MASS INDEX: 33.93 KG/M2 | WEIGHT: 256 LBS | OXYGEN SATURATION: 96 %

## 2020-09-15 DIAGNOSIS — M54.16 RADICULOPATHY, LUMBAR REGION: ICD-10-CM

## 2020-09-15 PROCEDURE — 99214 OFFICE O/P EST MOD 30 MIN: CPT

## 2020-09-15 NOTE — CONSULT LETTER
[Dear  ___] : Dear  [unfilled], [Courtesy Letter:] : I had the pleasure of seeing your patient, [unfilled], in my office today. [Sincerely,] : Sincerely, [FreeTextEntry2] : Kareem Matthew MD\par [address]\par [place] [address] \par  [FreeTextEntry1] : Mr. Ronquillo is a very pleasant 65-year-old male patient who was seen in our office today in regards to chronic neck and low back pain.  The patient is well-known to our service and has been followed for almost a year now with regards to these issues.\par \par Overall, the patient continues to remain in pain but is managing well with conservative therapy, specifically physical therapy.  The patient's neck pain and back pain generally fluctuates and the back pain is associated with right-sided leg numbness intermittently.  Currently, the patient's pain is rated at a 4/10 which is significantly better than his initial visit when he was complaining of 7/10 severity.  Briefly, the patient's original injury occurred on September 7, 2019 when that he fell off a ladder.  The patient was diagnosed with a ligamentous injury of the cervical spine as well as right-sided facet arthrosis and edema.  Since then, the patient has been improving with respect to his pain with conservative therapies but continues to have occasional exacerbations.  The patient currently does not wish to start any medications or consider injection therapy and would prefer to manage his pain with exercise and physical therapy.  The patient has also started a smoking cessation program which I have highly encouraged.\par \par On examination, patient is alert, oriented, and compliant with the exam.  The patient still has a limited range of motion of the lower back and cervical spine secondary to pain.  The patient's pain in the neck is worse with rotation to the right side.  The patient demonstrates 5/5 strength in the upper and lower extremities bilaterally.  Currently, there is no numbness in the lower extremity on the right side.\par \par The patient is accompanied with updated imaging dated September 1, 2020.  The patient's MRI scan of the cervical spine continues to demonstrate edema in the interspinous region between C4 and C5, and continued facet edema at C2/3 on the right side.  However, these findings have considerably improved compared to the patient's original scan approximately a year ago.  The patient's low back findings have not changed dramatically.  The patient has evidence of moderate degenerative changes with multiple disc bulges and loss of height throughout the lumbar spine.  The worst level is noted at L4/5 where the patient has a right-sided disc bulge with hypertrophy of ligamentum flavum.\par \par Taken together, the patient has a clinical history and radiographic findings most consistent with chronic back pain with a mild right-sided lumbar radiculopathy secondary to degenerative changes as well as chronic neck pain secondary to trauma which is resolving radiographically.  From a symptomatic perspective, the patient is managing well with physical therapy and I have highly encouraged the patient continue with this treatment regimen.  I have offered the patient the option of adding medications and/or injection therapy but the patient would like to defer these options at this time. At the patient's request, we will be following up with the patient on an as-needed basis and would be happy to see him back in the office should the need arise. [FreeTextEntry3] : Hung Deleon MD, PhD, FRCPSC \par Attending Neurosurgeon \par [facility] \par [address], [address] \[place] [address] \par Office: [phone] \par Fax: [phone]\par \par

## 2021-02-05 ENCOUNTER — APPOINTMENT (OUTPATIENT)
Dept: INTERNAL MEDICINE | Facility: CLINIC | Age: 66
End: 2021-02-05
Payer: MEDICARE

## 2021-02-05 VITALS
RESPIRATION RATE: 18 BRPM | BODY MASS INDEX: 30.88 KG/M2 | HEIGHT: 73 IN | HEART RATE: 81 BPM | OXYGEN SATURATION: 96 % | TEMPERATURE: 99.1 F | WEIGHT: 233 LBS | SYSTOLIC BLOOD PRESSURE: 146 MMHG | DIASTOLIC BLOOD PRESSURE: 82 MMHG

## 2021-02-05 PROCEDURE — 99214 OFFICE O/P EST MOD 30 MIN: CPT

## 2021-02-05 PROCEDURE — 99072 ADDL SUPL MATRL&STAF TM PHE: CPT

## 2021-02-05 NOTE — HISTORY OF PRESENT ILLNESS
[FreeTextEntry1] : Followup [de-identified] : Tomás presents for followup evaluation. He had an episode of severe right flank pain on 12/26. It lasted for a possibly one hour. This was associated with some nausea. He had no associated fevers or chills. There was no hematuria. Room on had a similar episode approximately 2 days ago. He again had right flank pain. There was no gross hematuria. He has no previous history of nephrolithiasis. He did have blood work from the [facility] fundal which showed microscopic hematuria.

## 2021-02-06 ENCOUNTER — RESULT REVIEW (OUTPATIENT)
Age: 66
End: 2021-02-06

## 2021-02-06 ENCOUNTER — TRANSCRIPTION ENCOUNTER (OUTPATIENT)
Age: 66
End: 2021-02-06

## 2021-02-06 ENCOUNTER — OUTPATIENT (OUTPATIENT)
Dept: OUTPATIENT SERVICES | Facility: HOSPITAL | Age: 66
LOS: 1 days | End: 2021-02-06
Payer: MEDICARE

## 2021-02-06 ENCOUNTER — APPOINTMENT (OUTPATIENT)
Dept: CT IMAGING | Facility: CLINIC | Age: 66
End: 2021-02-06
Payer: MEDICARE

## 2021-02-06 DIAGNOSIS — Z93.3 COLOSTOMY STATUS: Chronic | ICD-10-CM

## 2021-02-06 DIAGNOSIS — R31.9 HEMATURIA, UNSPECIFIED: ICD-10-CM

## 2021-02-06 DIAGNOSIS — R10.9 UNSPECIFIED ABDOMINAL PAIN: ICD-10-CM

## 2021-02-06 PROCEDURE — 74176 CT ABD & PELVIS W/O CONTRAST: CPT

## 2021-02-06 PROCEDURE — 74176 CT ABD & PELVIS W/O CONTRAST: CPT | Mod: 26,MH

## 2021-02-08 ENCOUNTER — APPOINTMENT (OUTPATIENT)
Dept: UROLOGY | Facility: CLINIC | Age: 66
End: 2021-02-08
Payer: MEDICARE

## 2021-02-08 ENCOUNTER — APPOINTMENT (OUTPATIENT)
Dept: RADIOLOGY | Facility: CLINIC | Age: 66
End: 2021-02-08
Payer: MEDICARE

## 2021-02-08 ENCOUNTER — OUTPATIENT (OUTPATIENT)
Dept: OUTPATIENT SERVICES | Facility: HOSPITAL | Age: 66
LOS: 1 days | End: 2021-02-08
Payer: MEDICARE

## 2021-02-08 VITALS
WEIGHT: 230 LBS | BODY MASS INDEX: 30.48 KG/M2 | DIASTOLIC BLOOD PRESSURE: 89 MMHG | HEIGHT: 73 IN | SYSTOLIC BLOOD PRESSURE: 170 MMHG | HEART RATE: 74 BPM | OXYGEN SATURATION: 97 %

## 2021-02-08 DIAGNOSIS — R10.9 UNSPECIFIED ABDOMINAL PAIN: ICD-10-CM

## 2021-02-08 DIAGNOSIS — Z93.3 COLOSTOMY STATUS: Chronic | ICD-10-CM

## 2021-02-08 PROCEDURE — 99205 OFFICE O/P NEW HI 60 MIN: CPT

## 2021-02-08 PROCEDURE — 74018 RADEX ABDOMEN 1 VIEW: CPT | Mod: 26

## 2021-02-08 PROCEDURE — 74018 RADEX ABDOMEN 1 VIEW: CPT

## 2021-02-08 PROCEDURE — 99072 ADDL SUPL MATRL&STAF TM PHE: CPT

## 2021-02-09 LAB
APPEARANCE: CLEAR
BACTERIA UR CULT: NORMAL
BACTERIA: NEGATIVE
BILIRUBIN URINE: NEGATIVE
BLOOD URINE: NORMAL
COLOR: YELLOW
GLUCOSE QUALITATIVE U: NEGATIVE
HYALINE CASTS: 1 /LPF
KETONES URINE: NEGATIVE
LEUKOCYTE ESTERASE URINE: NEGATIVE
MICROSCOPIC-UA: NORMAL
NITRITE URINE: NEGATIVE
PH URINE: 6
PROTEIN URINE: NORMAL
PSA SERPL-MCNC: 0.5 NG/ML
RED BLOOD CELLS URINE: 3 /HPF
SPECIFIC GRAVITY URINE: 1.02
SQUAMOUS EPITHELIAL CELLS: 0 /HPF
UROBILINOGEN URINE: NORMAL
WHITE BLOOD CELLS URINE: 1 /HPF

## 2021-02-10 ENCOUNTER — NON-APPOINTMENT (OUTPATIENT)
Age: 66
End: 2021-02-10

## 2021-02-10 NOTE — ASSESSMENT
[FreeTextEntry1] : Labs and flat plate Xray are ordered. Patient will finish his antibiotics and will be given some samples of Flomax. Patient was educated on stone prevention with hydration and increasing citrate in his diet.

## 2021-02-10 NOTE — HISTORY OF PRESENT ILLNESS
[FreeTextEntry1] : This patient presents with complaint of right flank pain with an episode of hematuria. He had a CAT scan done which shows a right 3 mm uretero vesicular junction stone which appears to be entering the bladder. The patient reports his pain is improving and he was placed on Ciprofloxacin. He denies any other urinary complaints or history of renal stones. He states he has family history of renal stones.

## 2021-02-11 LAB — URINE CYTOLOGY: NORMAL

## 2021-02-23 ENCOUNTER — NON-APPOINTMENT (OUTPATIENT)
Age: 66
End: 2021-02-23

## 2021-02-23 ENCOUNTER — APPOINTMENT (OUTPATIENT)
Dept: INTERNAL MEDICINE | Facility: CLINIC | Age: 66
End: 2021-02-23
Payer: MEDICARE

## 2021-02-23 VITALS
DIASTOLIC BLOOD PRESSURE: 84 MMHG | HEIGHT: 73 IN | HEART RATE: 86 BPM | TEMPERATURE: 98.1 F | SYSTOLIC BLOOD PRESSURE: 144 MMHG | WEIGHT: 230 LBS | OXYGEN SATURATION: 96 % | RESPIRATION RATE: 18 BRPM | BODY MASS INDEX: 30.48 KG/M2

## 2021-02-23 DIAGNOSIS — N20.0 CALCULUS OF KIDNEY: ICD-10-CM

## 2021-02-23 DIAGNOSIS — R06.00 DYSPNEA, UNSPECIFIED: ICD-10-CM

## 2021-02-23 PROCEDURE — 99072 ADDL SUPL MATRL&STAF TM PHE: CPT

## 2021-02-23 PROCEDURE — 99214 OFFICE O/P EST MOD 30 MIN: CPT

## 2021-02-23 NOTE — PLAN
[FreeTextEntry1] : Tomás presents for followup evaluation. He has a right-sided nephrolithiasis. He scheduled for a bladder scan tomorrow by neurology. He has had no further flank pain. Lab work was reviewed as per the [employer] phone. He will followup in 3 months.

## 2021-02-24 ENCOUNTER — APPOINTMENT (OUTPATIENT)
Dept: UROLOGY | Facility: CLINIC | Age: 66
End: 2021-02-24
Payer: MEDICARE

## 2021-02-24 VITALS
TEMPERATURE: 97.5 F | SYSTOLIC BLOOD PRESSURE: 155 MMHG | WEIGHT: 230 LBS | OXYGEN SATURATION: 98 % | BODY MASS INDEX: 30.48 KG/M2 | HEART RATE: 75 BPM | HEIGHT: 73 IN | DIASTOLIC BLOOD PRESSURE: 89 MMHG

## 2021-02-24 PROCEDURE — 52000 CYSTOURETHROSCOPY: CPT

## 2021-02-24 PROCEDURE — 99072 ADDL SUPL MATRL&STAF TM PHE: CPT

## 2021-03-25 NOTE — ED STATDOCS - NS ED ATTENDING STATEMENT MOD
Left msg for Pt and to call back with any questions   I have personally performed a face to face diagnostic evaluation on this patient. I have reviewed the ACP note and agree with the history, exam and plan of care, except as noted.

## 2021-04-06 ENCOUNTER — APPOINTMENT (OUTPATIENT)
Dept: NEUROSURGERY | Facility: CLINIC | Age: 66
End: 2021-04-06
Payer: MEDICARE

## 2021-04-06 ENCOUNTER — APPOINTMENT (OUTPATIENT)
Dept: RADIOLOGY | Facility: CLINIC | Age: 66
End: 2021-04-06
Payer: MEDICARE

## 2021-04-06 ENCOUNTER — OUTPATIENT (OUTPATIENT)
Dept: OUTPATIENT SERVICES | Facility: HOSPITAL | Age: 66
LOS: 1 days | End: 2021-04-06
Payer: MEDICARE

## 2021-04-06 VITALS
DIASTOLIC BLOOD PRESSURE: 82 MMHG | BODY MASS INDEX: 30.48 KG/M2 | SYSTOLIC BLOOD PRESSURE: 151 MMHG | OXYGEN SATURATION: 98 % | HEART RATE: 73 BPM | HEIGHT: 73 IN | WEIGHT: 230 LBS | TEMPERATURE: 97.8 F

## 2021-04-06 DIAGNOSIS — Z93.3 COLOSTOMY STATUS: Chronic | ICD-10-CM

## 2021-04-06 DIAGNOSIS — M54.5 LOW BACK PAIN: ICD-10-CM

## 2021-04-06 DIAGNOSIS — M54.2 CERVICALGIA: ICD-10-CM

## 2021-04-06 PROCEDURE — 72040 X-RAY EXAM NECK SPINE 2-3 VW: CPT | Mod: 26

## 2021-04-06 PROCEDURE — 99214 OFFICE O/P EST MOD 30 MIN: CPT

## 2021-04-06 PROCEDURE — 72040 X-RAY EXAM NECK SPINE 2-3 VW: CPT

## 2021-04-06 NOTE — CONSULT LETTER
[Dear  ___] : Dear  [unfilled], [Courtesy Letter:] : I had the pleasure of seeing your patient, [unfilled], in my office today. [Sincerely,] : Sincerely, [FreeTextEntry2] : Kareem Matthew MD\par [address]\par [place] [address] \par  [FreeTextEntry1] : Mr. Ronquillo is a very pleasant 66-year-old male patient who was seen in our office today in follow-up.  The patient has been followed by our office for neck and low back pain ever since a fall in 2019.\par \par Unfortunately, the patient's neck pain has worsened over the last month and is now rated an 8-9/10 in severity.  The patient's lower back pain conversely has improved somewhat and is now rated at a 5/10 in severity.  The patient's upper cervical pain is primarily localized in the midline and occasionally radiates to the shoulders.  However, the patient's pain is worst at night and  the patient's axial pain is the patient's primary concern.  The patient denies any new numbness or tingling in the hands or feet, difficulty walking, or new bowel/bladder symptoms.\par \par On examination, the patient is alert, oriented, and compliant with the exam.  The patient demonstrates 5/5 strength in the upper and lower extremities bilaterally.  The patient ambulates well.  The patient has a somewhat limited range of motion of the neck and low back secondary to pain.\par \par I do not have any new imaging to review today.  The patient's previous images demonstrate ligamentous injury between C4 and C5 in the interspinous region as well as significant facet and lateral mass edema at C3 on the right side. The patient additionally has severe facet arthrosis at C2/3 on the left with evidence of autofusion.\par \par Taken together, the patient has a clinical history and radiographic findings most consistent with arthritic and ligamentous causes of his neck pain.  Although the patient has been managing reasonably well with conservative therapy thus far, the patient has had a progressive worsening of his neck pain in the last few months.  The patient has been participating with physical therapy up until a week ago.  The patient states that physical therapy did not help with his pain.  Currently, the patient only takes Tylenol as needed for his pain.  At this time, I outlined several options for the patient including medications,  acupuncture, injection therapy, and finally surgery.  From a surgical perspective, I explained to the patient that he may be a candidate for a C2-C5 fusion should his pain be debilitating and not successfully treated with more conservative therapies.  At this time, however, the patient would like to continue with conservative therapy which I think is very reasonable.  I have provided the patient a prescription for tizanidine as well as Celebrex from a medication perspective.  I have also provided the patient a prescription for updated images of the cervical spine to rule out structural causes or new instability.  Finally, I have provided the patient a prescription for acupuncture.  At this time, the patient will be following up with us in approximately 6 to 8 weeks to review his imaging and review his clinical progress. [FreeTextEntry3] : Hung Deleon MD, PhD, FRCPSC \par Attending Neurosurgeon \par [facility] \par [address], [address] \[place] [address] \par Office: [phone] \par Fax: [phone]\par \par

## 2021-04-12 ENCOUNTER — APPOINTMENT (OUTPATIENT)
Dept: MRI IMAGING | Facility: CLINIC | Age: 66
End: 2021-04-12
Payer: MEDICARE

## 2021-04-12 ENCOUNTER — OUTPATIENT (OUTPATIENT)
Dept: OUTPATIENT SERVICES | Facility: HOSPITAL | Age: 66
LOS: 1 days | End: 2021-04-12
Payer: MEDICARE

## 2021-04-12 DIAGNOSIS — M54.2 CERVICALGIA: ICD-10-CM

## 2021-04-12 DIAGNOSIS — Z93.3 COLOSTOMY STATUS: Chronic | ICD-10-CM

## 2021-04-12 PROCEDURE — 72141 MRI NECK SPINE W/O DYE: CPT | Mod: 26,ME

## 2021-04-12 PROCEDURE — 72141 MRI NECK SPINE W/O DYE: CPT

## 2021-04-12 PROCEDURE — G1004: CPT

## 2021-05-18 ENCOUNTER — APPOINTMENT (OUTPATIENT)
Dept: INTERNAL MEDICINE | Facility: CLINIC | Age: 66
End: 2021-05-18

## 2021-05-18 DIAGNOSIS — Z87.898 PERSONAL HISTORY OF OTHER SPECIFIED CONDITIONS: ICD-10-CM

## 2021-05-18 RX ORDER — CIPROFLOXACIN HYDROCHLORIDE 500 MG/1
500 TABLET, FILM COATED ORAL
Qty: 14 | Refills: 0 | Status: COMPLETED | COMMUNITY
Start: 2021-02-05 | End: 2021-05-18

## 2021-05-24 ENCOUNTER — APPOINTMENT (OUTPATIENT)
Dept: INTERNAL MEDICINE | Facility: CLINIC | Age: 66
End: 2021-05-24

## 2021-06-08 ENCOUNTER — APPOINTMENT (OUTPATIENT)
Dept: NEUROSURGERY | Facility: CLINIC | Age: 66
End: 2021-06-08
Payer: MEDICARE

## 2021-06-08 VITALS
WEIGHT: 230 LBS | HEART RATE: 94 BPM | TEMPERATURE: 97.7 F | BODY MASS INDEX: 30.48 KG/M2 | DIASTOLIC BLOOD PRESSURE: 81 MMHG | OXYGEN SATURATION: 97 % | HEIGHT: 73 IN | SYSTOLIC BLOOD PRESSURE: 168 MMHG

## 2021-06-08 DIAGNOSIS — S13.4XXS SPRAIN OF LIGAMENTS OF CERVICAL SPINE, SEQUELA: ICD-10-CM

## 2021-06-08 DIAGNOSIS — M54.2 CERVICALGIA: ICD-10-CM

## 2021-06-08 DIAGNOSIS — G89.29 CERVICALGIA: ICD-10-CM

## 2021-06-08 PROCEDURE — 99214 OFFICE O/P EST MOD 30 MIN: CPT

## 2021-06-10 PROBLEM — M54.2 CHRONIC NECK PAIN: Status: ACTIVE | Noted: 2020-08-06

## 2021-06-10 PROBLEM — S13.4XXS: Status: ACTIVE | Noted: 2020-08-20

## 2021-06-10 NOTE — CONSULT LETTER
[Dear  ___] : Dear  [unfilled], [Courtesy Letter:] : I had the pleasure of seeing your patient, [unfilled], in my office today. [Sincerely,] : Sincerely, [FreeTextEntry2] : Kareem Matthew MD\par [address]\par [place] [address] \par  \par  [FreeTextEntry1] : Mr. Ronquillo is a very pleasant 66-year-old male patient who was seen in our office today in follow-up in regards to chronic neck pain..  We have been following the patient since September 2019 after the patient suffered a fall.\par \par I am happy to see that the patient is currently improving with regards to his neck pain.  Throughout the years, the patient has had episodes of acute exacerbations of his neck and low back pain.  At his last visit, the patient was complaining of worsening neck pain and thus advanced imaging was ordered.  The patient's neck pain remains the same in character and is somewhat lessened in severity compared to his last visit.  The patient denies any other new concerns.\par \par On examination, the patient is alert, oriented, and compliant with the exam.  The patient demonstrates 5/5 strength in the upper and lower extremities bilaterally.  The patient has somewhat limited range of motion of this cervical and lumbar spine secondary to tentativeness numbness and pain.\par \par The patient is accompanied with updated images of the cervical spine dated April 12, 2021.  These images demonstrate continued resolution of the patient's ligamentous injuries.  The patient continues to have ligamentous edema between C4/5 as well as on the right facet joint at C3/4.  Both of these findings have decreased in severity since 2019 as well as compared to his previous scan in 2020.\par \par Taken together, I am gratified see the patient continue to do well with conservative therapy.  I reiterated to the patient that he will most likely continue to improve over time with regards to his pain, but his pain will likely continue to wax and wane.  At this time, the patient is happy with his progress as well as his imaging findings.  The patient will be in contact with us on an as-needed basis at his request. [FreeTextEntry3] : Hung Deleon MD, PhD, FRCPSC \par Attending Neurosurgeon \par [facility] \par [address] \[place] [address] \par Office: [phone] \par Fax: [phone]\par \par

## 2021-11-24 ENCOUNTER — APPOINTMENT (OUTPATIENT)
Dept: INTERNAL MEDICINE | Facility: CLINIC | Age: 66
End: 2021-11-24
Payer: MEDICARE

## 2021-11-24 VITALS
OXYGEN SATURATION: 96 % | DIASTOLIC BLOOD PRESSURE: 84 MMHG | SYSTOLIC BLOOD PRESSURE: 150 MMHG | RESPIRATION RATE: 18 BRPM | BODY MASS INDEX: 33 KG/M2 | HEIGHT: 73 IN | WEIGHT: 249 LBS | TEMPERATURE: 99.2 F | HEART RATE: 99 BPM

## 2021-11-24 DIAGNOSIS — Z00.00 ENCOUNTER FOR GENERAL ADULT MEDICAL EXAMINATION W/OUT ABNORMAL FINDINGS: ICD-10-CM

## 2021-11-24 PROCEDURE — 99214 OFFICE O/P EST MOD 30 MIN: CPT | Mod: 25

## 2021-11-24 PROCEDURE — G0447 BEHAVIOR COUNSEL OBESITY 15M: CPT | Mod: 59

## 2021-11-24 PROCEDURE — 99406 BEHAV CHNG SMOKING 3-10 MIN: CPT

## 2021-11-24 RX ORDER — FLUTICASONE PROPIONATE 110 UG/1
110 AEROSOL, METERED RESPIRATORY (INHALATION)
Refills: 0 | Status: ACTIVE | COMMUNITY
Start: 2021-11-24

## 2021-11-24 NOTE — COUNSELING
[Risk of tobacco use and health benefits of smoking cessation discussed] : Risk of tobacco use and health benefits of smoking cessation discussed [Cessation strategies including cessation program discussed] : Cessation strategies including cessation program discussed [Use of nicotine replacement therapies and other medications discussed] : Use of nicotine replacement therapies and other medications discussed [Willing to Quit Smoking] : Willing to quit smoking [Tobacco Use Cessation Intermediate Greater Than 3 Minutes Up to 10 Minutes] : Tobacco Use Cessation Intermediate Greater Than 3 Minutes Up to 10 Minutes [FreeTextEntry1] : 5 [Potential consequences of obesity discussed] : Potential consequences of obesity discussed [Benefits of weight loss discussed] : Benefits of weight loss discussed [Structured Weight Management Program suggested:] : Structured weight management program suggested [Decrease Portions] : decrease portions [____ min/wk Activity] : [unfilled] min/wk activity [FreeTextEntry2] : healthy foods, RUBÉN [FreeTextEntry4] : 15

## 2021-11-24 NOTE — ASSESSMENT
[FreeTextEntry1] : #1  Elevated blood pressure reading earlier today.  Repeat in office 132/72 left arm, 134/72 right arm large cuff.  Patient will adhere to a healthy, weight reduction, RUBÉN diet, exercise regularly.  Return visit in 2 months with Dr. Matthew for recheck blood pressure.  Continue to monitor blood pressure readings at home.  Will obtain fasting blood work.\par \par #2 Cigarette smoking.  See above.\par \par #3 Asthma.  Symptoms tend to act up with change of weather.  Patient will continue Flovent 110, 2 puffs/day, albuterol as needed for rescue.\par \par #4 Obesity. See above.

## 2021-11-24 NOTE — PHYSICAL EXAM
[No Acute Distress] : no acute distress [Well Nourished] : well nourished [Well Developed] : well developed [Well-Appearing] : well-appearing [Normal Sclera/Conjunctiva] : normal sclera/conjunctiva [Normal Outer Ear/Nose] : the outer ears and nose were normal in appearance [Normal Oropharynx] : the oropharynx was normal [No JVD] : no jugular venous distention [No Lymphadenopathy] : no lymphadenopathy [Supple] : supple [No Respiratory Distress] : no respiratory distress  [No Accessory Muscle Use] : no accessory muscle use [Clear to Auscultation] : lungs were clear to auscultation bilaterally [Normal Rate] : normal rate  [Regular Rhythm] : with a regular rhythm [Normal S1, S2] : normal S1 and S2 [No Carotid Bruits] : no carotid bruits [No Edema] : there was no peripheral edema [No Extremity Clubbing/Cyanosis] : no extremity clubbing/cyanosis [Soft] : abdomen soft [Non Tender] : non-tender [Non-distended] : non-distended [No HSM] : no HSM [Normal Bowel Sounds] : normal bowel sounds [Normal Anterior Cervical Nodes] : no anterior cervical lymphadenopathy [No Rash] : no rash [Coordination Grossly Intact] : coordination grossly intact [No Focal Deficits] : no focal deficits [Normal Gait] : normal gait [Normal Affect] : the affect was normal [Normal Insight/Judgement] : insight and judgment were intact

## 2021-11-24 NOTE — HISTORY OF PRESENT ILLNESS
[FreeTextEntry8] : This is a pleasant 66-year-old male with a history of previous kidney stones, beta Thal trait, who was seen at [facility] earlier today where his blood pressure reading was noted to be 154/90, he was sent here for further evaluation.  Patient tells me he did have a stressful situation this morning.  He is not having any chest pain, shortness of breath, headaches, dizziness, or epistaxis.\par \par His blood pressure readings at home are about 140 over 70s with a large cuff.\par \par He started smoking cigarettes again about 6 months ago was counseled again on cessation today.  See below.

## 2021-12-08 ENCOUNTER — NON-APPOINTMENT (OUTPATIENT)
Age: 66
End: 2021-12-08

## 2021-12-08 ENCOUNTER — APPOINTMENT (OUTPATIENT)
Dept: INTERNAL MEDICINE | Facility: CLINIC | Age: 66
End: 2021-12-08
Payer: MEDICARE

## 2021-12-08 VITALS
TEMPERATURE: 98.3 F | DIASTOLIC BLOOD PRESSURE: 70 MMHG | OXYGEN SATURATION: 97 % | HEART RATE: 93 BPM | WEIGHT: 242 LBS | HEIGHT: 73 IN | SYSTOLIC BLOOD PRESSURE: 130 MMHG | BODY MASS INDEX: 32.07 KG/M2 | RESPIRATION RATE: 16 BRPM

## 2021-12-08 PROCEDURE — 99214 OFFICE O/P EST MOD 30 MIN: CPT | Mod: 25

## 2021-12-08 PROCEDURE — 94010 BREATHING CAPACITY TEST: CPT

## 2021-12-08 NOTE — HISTORY OF PRESENT ILLNESS
[FreeTextEntry1] : followup [de-identified] : Tomás presents for an acute evaluation. He is complaining of a persistent, dry cough for the past 10 days. He states that 2 of his grandchildren have developed clots. He is carotid negative. The cough occurs during the day and night. He has no associated fevers, chills or shortness of breath. Her mom continues to smoke one half to one pack of cigarettes per day.

## 2021-12-08 NOTE — PLAN
[FreeTextEntry1] : Tomás presents for a followup evaluation. He is complaining of a nonproductive cough for the past 7-10 days. Patient is an active smoker. He will be given a 9 day tapering course of prednisone. Patient will also be sent for a PA and lateral chest x-ray. Followup as scheduled.

## 2021-12-08 NOTE — DATA REVIEWED
[FreeTextEntry1] : Spirometry shows mild restrictive lung defect. FEV1 is 2.89 L which is 76% predicted. FVC is 3.50 L which is 69% predicted. FEV1/FVC ratio is 83%.

## 2022-02-28 ENCOUNTER — APPOINTMENT (OUTPATIENT)
Dept: INTERNAL MEDICINE | Facility: CLINIC | Age: 67
End: 2022-02-28
Payer: MEDICARE

## 2022-02-28 VITALS
WEIGHT: 248 LBS | RESPIRATION RATE: 16 BRPM | BODY MASS INDEX: 32.87 KG/M2 | OXYGEN SATURATION: 97 % | DIASTOLIC BLOOD PRESSURE: 80 MMHG | TEMPERATURE: 98.3 F | HEIGHT: 73 IN | HEART RATE: 84 BPM | SYSTOLIC BLOOD PRESSURE: 160 MMHG

## 2022-02-28 DIAGNOSIS — L98.9 DISORDER OF THE SKIN AND SUBCUTANEOUS TISSUE, UNSPECIFIED: ICD-10-CM

## 2022-02-28 DIAGNOSIS — Z00.00 ENCOUNTER FOR GENERAL ADULT MEDICAL EXAMINATION W/OUT ABNORMAL FINDINGS: ICD-10-CM

## 2022-02-28 PROCEDURE — G0438: CPT

## 2022-02-28 RX ORDER — PREDNISONE 20 MG/1
20 TABLET ORAL DAILY
Qty: 18 | Refills: 1 | Status: DISCONTINUED | COMMUNITY
Start: 2021-12-08 | End: 2022-02-28

## 2022-02-28 RX ORDER — TAMSULOSIN HYDROCHLORIDE 0.4 MG/1
0.4 CAPSULE ORAL
Qty: 7 | Refills: 0 | Status: DISCONTINUED | OUTPATIENT
Start: 2021-02-08 | End: 2022-02-28

## 2022-02-28 RX ORDER — TIZANIDINE 4 MG/1
4 TABLET ORAL 3 TIMES DAILY
Qty: 30 | Refills: 0 | Status: DISCONTINUED | COMMUNITY
Start: 2020-08-06 | End: 2022-02-28

## 2022-02-28 RX ORDER — DIAZEPAM 5 MG/1
5 TABLET ORAL
Qty: 2 | Refills: 0 | Status: DISCONTINUED | COMMUNITY
Start: 2021-04-12 | End: 2022-02-28

## 2022-03-01 NOTE — HEALTH RISK ASSESSMENT
[Current] : Current [Yes] : Yes [2 - 4 times a month (2 pts)] : 2-4 times a month (2 points) [1 or 2 (0 pts)] : 1 or 2 (0 points) [Never (0 pts)] : Never (0 points) [No] : In the past 12 months have you used drugs other than those required for medical reasons? No [0] : 2) Feeling down, depressed, or hopeless: Not at all (0) [Patient reported colonoscopy was normal] : Patient reported colonoscopy was normal [ColonoscopyDate] : 02/21

## 2022-03-01 NOTE — PHYSICAL EXAM
[No Acute Distress] : no acute distress [Well Nourished] : well nourished [Well Developed] : well developed [Well-Appearing] : well-appearing [Normal Sclera/Conjunctiva] : normal sclera/conjunctiva [PERRL] : pupils equal round and reactive to light [EOMI] : extraocular movements intact [Normal Outer Ear/Nose] : the outer ears and nose were normal in appearance [Normal Oropharynx] : the oropharynx was normal [No JVD] : no jugular venous distention [No Lymphadenopathy] : no lymphadenopathy [Supple] : supple [Thyroid Normal, No Nodules] : the thyroid was normal and there were no nodules present [No Respiratory Distress] : no respiratory distress  [No Accessory Muscle Use] : no accessory muscle use [Clear to Auscultation] : lungs were clear to auscultation bilaterally [Normal Rate] : normal rate  [Regular Rhythm] : with a regular rhythm [Normal S1, S2] : normal S1 and S2 [No Murmur] : no murmur heard [No Carotid Bruits] : no carotid bruits [No Abdominal Bruit] : a ~M bruit was not heard ~T in the abdomen [No Varicosities] : no varicosities [Pedal Pulses Present] : the pedal pulses are present [No Edema] : there was no peripheral edema [No Palpable Aorta] : no palpable aorta [No Extremity Clubbing/Cyanosis] : no extremity clubbing/cyanosis [Soft] : abdomen soft [Non Tender] : non-tender [Non-distended] : non-distended [No Masses] : no abdominal mass palpated [No HSM] : no HSM [Normal Bowel Sounds] : normal bowel sounds [Normal Posterior Cervical Nodes] : no posterior cervical lymphadenopathy [Normal Anterior Cervical Nodes] : no anterior cervical lymphadenopathy [No CVA Tenderness] : no CVA  tenderness [No Spinal Tenderness] : no spinal tenderness [No Joint Swelling] : no joint swelling [Grossly Normal Strength/Tone] : grossly normal strength/tone [No Rash] : no rash [Coordination Grossly Intact] : coordination grossly intact [No Focal Deficits] : no focal deficits [Normal Gait] : normal gait [Deep Tendon Reflexes (DTR)] : deep tendon reflexes were 2+ and symmetric [Normal Affect] : the affect was normal [Normal Insight/Judgement] : insight and judgment were intact [de-identified] : ventral hernia

## 2022-03-01 NOTE — PLAN
[FreeTextEntry1] : Mr. Martino presents for annual physical examination.\par \par #1. Tomás has an elevated blood pressure of 160/80 taken by me. He will be started on losartan 25 mg p.o. daily.\par \par #2. Lipid profile was reviewed. His 10 year risk for atherosclerotic cardiovascular disease is 24%. Patient will be started on Lipitor 20 mg daily with co-enzyme Q10 200 mg daily. He will repeat lipid profile in 6-8 weeks.\par \par #3. Patient has a chronic anemia. He has beta thalassemia trait.\par \par #4. Tomás has gained a significant amount of weight. He is going to go on a diet to lose weight to lifestyle modification.\par \par #5. Face is given a prescription for CMP, lipid profile and hemoglobin A1c level to be done in 6-8 weeks.

## 2022-03-01 NOTE — HISTORY OF PRESENT ILLNESS
[FreeTextEntry1] : annual physical examination [de-identified] : Throughout the knee presents for an annual physical examination. He denies any chest pain, shortness of breath or palpitations. The patient has no nocturnal symptoms of cough or dyspnea there is no hematuria or dysuria. He did go for a physical exam at the [facility]. He was found to be hypertensive. Patient was also found to have high cholesterol and anemia. He has a history of beta thalassemia trait. he continues to smoke.

## 2022-03-16 ENCOUNTER — APPOINTMENT (OUTPATIENT)
Dept: DERMATOLOGY | Facility: CLINIC | Age: 67
End: 2022-03-16
Payer: MEDICARE

## 2022-03-16 ENCOUNTER — NON-APPOINTMENT (OUTPATIENT)
Age: 67
End: 2022-03-16

## 2022-03-16 DIAGNOSIS — R20.2 PARESTHESIA OF SKIN: ICD-10-CM

## 2022-03-16 DIAGNOSIS — D18.00 HEMANGIOMA UNSPECIFIED SITE: ICD-10-CM

## 2022-03-16 DIAGNOSIS — L82.1 OTHER SEBORRHEIC KERATOSIS: ICD-10-CM

## 2022-03-16 PROCEDURE — 99204 OFFICE O/P NEW MOD 45 MIN: CPT

## 2022-03-16 NOTE — ASSESSMENT
[FreeTextEntry1] : skin examination is negative for malignancy; Multiple new concerns were addressed and discussed.\par Therapeutic options and their risks and benefits; along with multiple diagnostic possibilities were discussed at length;\par risks and benefits of skin biopsy and/or other further study were discussed;\par \par Continue regular exams; Pt. has 9-11 exams; \par Follow up for TBSE in 1 year\par \par no inflamed lesions on upper back;  likely notalgia/inflammatory changes; \par fluticasone ointment prn for itch;  proper  use explained; \par \par

## 2022-03-16 NOTE — PHYSICAL EXAM
[FreeTextEntry3] : Upper body skin examination performed of the face, neck, trunk, arms;\par The patient is well, alert and oriented, pleasant and cooperative.\par Eyelids, conjunctivae, oral mucosa, digits and nails all normal.  \par No cervical adenopathy.\par \par Normal findings include:\par Seborrheic keratoses\par Angiomas\par Lentigines;\par \par No findings were suspicious for malignancy.\par \par Mid upper back;  few papular cherry angiomas, Scaling waxy stuck on papules; no inflammatory changes;

## 2022-04-28 ENCOUNTER — APPOINTMENT (OUTPATIENT)
Dept: INTERNAL MEDICINE | Facility: CLINIC | Age: 67
End: 2022-04-28
Payer: MEDICARE

## 2022-04-28 ENCOUNTER — NON-APPOINTMENT (OUTPATIENT)
Age: 67
End: 2022-04-28

## 2022-04-28 VITALS
BODY MASS INDEX: 31.54 KG/M2 | DIASTOLIC BLOOD PRESSURE: 88 MMHG | TEMPERATURE: 99.1 F | OXYGEN SATURATION: 97 % | HEART RATE: 110 BPM | HEIGHT: 73 IN | RESPIRATION RATE: 16 BRPM | WEIGHT: 237.99 LBS | SYSTOLIC BLOOD PRESSURE: 162 MMHG

## 2022-04-28 DIAGNOSIS — E66.3 OVERWEIGHT: ICD-10-CM

## 2022-04-28 DIAGNOSIS — Z87.09 PERSONAL HISTORY OF OTHER DISEASES OF THE RESPIRATORY SYSTEM: ICD-10-CM

## 2022-04-28 PROCEDURE — 94060 EVALUATION OF WHEEZING: CPT

## 2022-04-28 PROCEDURE — 99214 OFFICE O/P EST MOD 30 MIN: CPT | Mod: 25

## 2022-04-28 RX ORDER — FLUTICASONE PROPIONATE 0.05 MG/G
0.01 OINTMENT TOPICAL
Qty: 1 | Refills: 1 | Status: DISCONTINUED | COMMUNITY
Start: 2022-03-16 | End: 2022-04-28

## 2022-04-28 RX ORDER — CELECOXIB 200 MG/1
200 CAPSULE ORAL TWICE DAILY
Qty: 60 | Refills: 0 | Status: DISCONTINUED | COMMUNITY
Start: 2021-04-06 | End: 2022-04-28

## 2022-04-28 NOTE — DATA REVIEWED
[FreeTextEntry1] : Spirometry pre-and postbronchodilator therapy shows mild restrictive defect. FEV1 is 2.73 L which is 72% predicted. FVC is 3.19 L which is 63% predicted. FEV1 percent is 86%. There is no significant bronchodilator response.

## 2022-04-28 NOTE — PLAN
[FreeTextEntry1] : Mr. Ronquillo presents for followup evaluation.\par \par #1. Blood pressure remains elevated at 162/88. He is currently on losartan 25 mg daily. Medication will be changed to losartan hydrochlorothiazide 50/12.5 mg daily. Patient will followup in 4 weeks for repeat blood pressure check as well as basic metabolic panel to monitor renal function.\par \par #2. Mr. Ronquillo will continue to followup at [facility] where he receives yearly CAT scans as part of the [facility] health program. He is an active smoker.\par \par #3. Patient has been given a prescription for CBC, CMP plus hemoglobin A1c, iron level, lipid profile, PSA level, TSH and urinalysis.\par \par #4. Patient has been counseled in smoking cessation and also in weight loss and lifestyle modification with diet and exercise.

## 2022-04-28 NOTE — HISTORY OF PRESENT ILLNESS
[FreeTextEntry1] : followup evaluation [de-identified] : Mr. Ronquillo presents for followup evaluation. He continues smoke 1/2-1-1/2 packs of cigarettes per day. Patient had been started on losartan 25 mg daily for treatment of hypertension. He denies any chest pain, shortness of breath or palpitations. No nocturnal symptoms of cough or dyspnea.

## 2022-05-12 LAB
ALBUMIN SERPL ELPH-MCNC: 4.6 G/DL
ALP BLD-CCNC: 86 U/L
ALT SERPL-CCNC: 24 U/L
ANION GAP SERPL CALC-SCNC: 16 MMOL/L
APPEARANCE: CLEAR
AST SERPL-CCNC: 19 U/L
BACTERIA: NEGATIVE
BASOPHILS # BLD AUTO: 0.05 K/UL
BASOPHILS NFR BLD AUTO: 0.6 %
BILIRUB SERPL-MCNC: 0.5 MG/DL
BILIRUBIN URINE: NEGATIVE
BLOOD URINE: ABNORMAL
BUN SERPL-MCNC: 18 MG/DL
CALCIUM SERPL-MCNC: 9.6 MG/DL
CHLORIDE SERPL-SCNC: 102 MMOL/L
CHOLEST SERPL-MCNC: 161 MG/DL
CO2 SERPL-SCNC: 23 MMOL/L
COLOR: YELLOW
CREAT SERPL-MCNC: 0.77 MG/DL
EGFR: 99 ML/MIN/1.73M2
EOSINOPHIL # BLD AUTO: 0.12 K/UL
EOSINOPHIL NFR BLD AUTO: 1.3 %
ESTIMATED AVERAGE GLUCOSE: 114 MG/DL
GLUCOSE QUALITATIVE U: NEGATIVE
GLUCOSE SERPL-MCNC: 111 MG/DL
HBA1C MFR BLD HPLC: 5.6 %
HCT VFR BLD CALC: 43 %
HDLC SERPL-MCNC: 41 MG/DL
HGB BLD-MCNC: 12.4 G/DL
HYALINE CASTS: 0 /LPF
IMM GRANULOCYTES NFR BLD AUTO: 0.3 %
IRON SERPL-MCNC: 80 UG/DL
KETONES URINE: NEGATIVE
LDLC SERPL CALC-MCNC: 93 MG/DL
LEUKOCYTE ESTERASE URINE: NEGATIVE
LYMPHOCYTES # BLD AUTO: 1.63 K/UL
LYMPHOCYTES NFR BLD AUTO: 18.2 %
MAN DIFF?: NORMAL
MCHC RBC-ENTMCNC: 21.2 PG
MCHC RBC-ENTMCNC: 28.8 GM/DL
MCV RBC AUTO: 73.5 FL
MICROSCOPIC-UA: NORMAL
MONOCYTES # BLD AUTO: 0.62 K/UL
MONOCYTES NFR BLD AUTO: 6.9 %
NEUTROPHILS # BLD AUTO: 6.53 K/UL
NEUTROPHILS NFR BLD AUTO: 72.7 %
NITRITE URINE: NEGATIVE
NONHDLC SERPL-MCNC: 119 MG/DL
PH URINE: 6
PLATELET # BLD AUTO: 226 K/UL
POTASSIUM SERPL-SCNC: 3.9 MMOL/L
PROT SERPL-MCNC: 7.6 G/DL
PROTEIN URINE: ABNORMAL
PSA SERPL-MCNC: 0.33 NG/ML
RBC # BLD: 5.85 M/UL
RBC # FLD: 16.1 %
RED BLOOD CELLS URINE: 3 /HPF
SODIUM SERPL-SCNC: 141 MMOL/L
SPECIFIC GRAVITY URINE: 1.03
SQUAMOUS EPITHELIAL CELLS: 0 /HPF
TRIGL SERPL-MCNC: 130 MG/DL
TSH SERPL-ACNC: 2.68 UIU/ML
UROBILINOGEN URINE: NORMAL
WBC # FLD AUTO: 8.98 K/UL
WHITE BLOOD CELLS URINE: 1 /HPF

## 2022-06-01 ENCOUNTER — NON-APPOINTMENT (OUTPATIENT)
Age: 67
End: 2022-06-01

## 2022-06-01 ENCOUNTER — APPOINTMENT (OUTPATIENT)
Dept: INTERNAL MEDICINE | Facility: CLINIC | Age: 67
End: 2022-06-01
Payer: MEDICARE

## 2022-06-01 VITALS
WEIGHT: 234 LBS | OXYGEN SATURATION: 96 % | BODY MASS INDEX: 31.01 KG/M2 | HEART RATE: 92 BPM | TEMPERATURE: 98.4 F | DIASTOLIC BLOOD PRESSURE: 76 MMHG | HEIGHT: 73 IN | RESPIRATION RATE: 16 BRPM | SYSTOLIC BLOOD PRESSURE: 112 MMHG

## 2022-06-01 PROCEDURE — 99214 OFFICE O/P EST MOD 30 MIN: CPT

## 2022-06-02 LAB
APPEARANCE: CLEAR
BACTERIA: NEGATIVE
BILIRUBIN URINE: NEGATIVE
BLOOD URINE: NORMAL
COLOR: YELLOW
GLUCOSE QUALITATIVE U: NEGATIVE
HYALINE CASTS: 1 /LPF
KETONES URINE: NEGATIVE
LEUKOCYTE ESTERASE URINE: NEGATIVE
MICROSCOPIC-UA: NORMAL
NITRITE URINE: NEGATIVE
PH URINE: 5.5
PROTEIN URINE: NORMAL
RED BLOOD CELLS URINE: 2 /HPF
SPECIFIC GRAVITY URINE: 1.03
SQUAMOUS EPITHELIAL CELLS: 0 /HPF
UROBILINOGEN URINE: NORMAL
WHITE BLOOD CELLS URINE: 1 /HPF

## 2022-06-03 ENCOUNTER — NON-APPOINTMENT (OUTPATIENT)
Age: 67
End: 2022-06-03

## 2022-06-13 NOTE — ED ADULT TRIAGE NOTE - NS ED TRIAGE AVPU SCALE
Health Maintenance Due   Topic Date Due   • Pneumococcal Vaccine 0-64 (1 - PCV) Never done   • COVID-19 Vaccine (3 - Booster for Moderna series) 09/23/2021       Patient is due for topics as listed above but is not proceeding with Immunization(s) COVID-19 and Pneumococcal at this time. Education provided for Immunization(s) COVID-19.    
SUBJECTIVE:  Maria Guadalupe is a 36 year old female, who presents today for a  2 week postop visit following a primary  section on 2022.  Patient is having some minor complaints of pain around incision and around her hematoma that was diagnosed by CT scan in the ER. She has been having light yellow watery discharge from the right corner of the incision for a few days.  No fever or redness.  Surgery reviewed with patient in detail, operative findings reviewed. Patient's pathologic findings were discussed in detail.  Post op follow-up and expectations discussed and reviewed.  Short and long term treatment options and follow-up reviewed also.  All questions answered.    PHYSICAL EXAM:  Visit Vitals  /76   Ht 5' 5\" (1.651 m)   Wt 122.5 kg (270 lb)   LMP 2021 (Exact Date)   Breastfeeding Yes   BMI 44.93 kg/m²     ABD:Benign, Soft, non-tender, No masses, organomegaly.  INCISION: dry and intact, healing well with good reapproximation, no evidence of infection.  There is a small, less then 1 cm, opening at the right margin of the incision that has a thin watery discharge coming from it.  The area of the hematoma is soft and there is no redness.  PELVIC:  Deferred.    ASSESSMENT:   2 week post-op check-up with satisfactory progress.      PLAN:  Patient can start light activities of light housework, stairs, lifting up to 25# and driving short distances.  Still nothing per vagina until 4-6 weeks post-op.  Plan a refill of pain medication with [facility] #10 with no refill.  Repeat CT next week to recheck her hematoma.  I encouraged her to monitor for fever, worsening pain or redness of the incision.  It does appear to be spontaneously draining.  Keep it open to the air and with a pad in the crease to keep it dry.    
Alert-The patient is alert, awake and responds to voice. The patient is oriented to time, place, and person. The triage nurse is able to obtain subjective information.

## 2022-09-19 ENCOUNTER — RX RENEWAL (OUTPATIENT)
Age: 67
End: 2022-09-19

## 2022-10-27 ENCOUNTER — APPOINTMENT (OUTPATIENT)
Dept: INTERNAL MEDICINE | Facility: CLINIC | Age: 67
End: 2022-10-27

## 2022-10-27 ENCOUNTER — NON-APPOINTMENT (OUTPATIENT)
Age: 67
End: 2022-10-27

## 2022-10-27 VITALS
OXYGEN SATURATION: 98 % | HEART RATE: 97 BPM | HEIGHT: 73 IN | BODY MASS INDEX: 31.81 KG/M2 | SYSTOLIC BLOOD PRESSURE: 144 MMHG | DIASTOLIC BLOOD PRESSURE: 72 MMHG | RESPIRATION RATE: 16 BRPM | TEMPERATURE: 98.8 F | WEIGHT: 240 LBS

## 2022-10-27 DIAGNOSIS — R03.0 ELEVATED BLOOD-PRESSURE READING, W/OUT DIAGNOSIS OF HYPERTENSION: ICD-10-CM

## 2022-10-27 DIAGNOSIS — R80.9 PROTEINURIA, UNSPECIFIED: ICD-10-CM

## 2022-10-27 DIAGNOSIS — Z87.448 PERSONAL HISTORY OF OTHER DISEASES OF URINARY SYSTEM: ICD-10-CM

## 2022-10-27 DIAGNOSIS — D56.3 THALASSEMIA MINOR: ICD-10-CM

## 2022-10-27 PROCEDURE — 94060 EVALUATION OF WHEEZING: CPT

## 2022-10-27 PROCEDURE — 99214 OFFICE O/P EST MOD 30 MIN: CPT | Mod: 25

## 2022-10-27 RX ORDER — ATORVASTATIN CALCIUM 20 MG/1
20 TABLET, FILM COATED ORAL
Qty: 90 | Refills: 1 | Status: DISCONTINUED | COMMUNITY
Start: 2022-02-28 | End: 2022-10-27

## 2022-10-27 NOTE — HISTORY OF PRESENT ILLNESS
[FreeTextEntry1] : Follow-up evaluation [de-identified] : Mr. Ronquillo presents for follow-up evaluation.  He denies any chest pain, shortness of breath or palpitations.  He has no nocturnal symptoms of cough or dyspnea.  There is no hematuria or dysuria.

## 2022-10-27 NOTE — PLAN
[FreeTextEntry1] : Mr. Ronquillo presents for follow-up evaluation.\par \par 1.  He will continue on current medication regimen which has been reviewed and revised.\par \par 2.  Blood pressure remains under control on losartan/hydrochlorothiazide 50/12.5 mg\par \par 3.  Patient has declined the influenza vaccine.\par \par 4.  Prescription for CBC, CMP, lipid profile and urinalysis has been given.\par \par 5.  Follow-up in this office in 6 months.

## 2022-10-27 NOTE — HISTORY OF PRESENT ILLNESS
[FreeTextEntry1] : followup [de-identified] : Patient presents for followup evaluation for blood pressure check. He was initially started on losartan 25 mg daily for hypertension. Losartan was then changed to losartan/hydrochlorothiazide 50/12.5 mg daily. Patient denies any chest pain, shortness of breath or palpitations.

## 2022-10-27 NOTE — DATA REVIEWED
[FreeTextEntry1] : Spirometry pre and postbronchodilator therapy shows mild restrictive defect.  FEV1 is 2.72 L which is 72% predicted.  FVC is 3.50 L which is 69% predicted.  FEV1 over FVC ratio 78%.  PEF is 5.31 L/s which is 57% predicted.

## 2022-10-27 NOTE — PLAN
[FreeTextEntry1] : Tomás presents for followup evaluation. Blood pressure is now under control on losartan hydrochlorothiazide 50/12.5 mg q. daily. Comprehensive blood profile was reviewed with the patient. Urinalysis showed mild proteinuria and blood. He will repeat urinalysis today. Patient will continue on metered-dose inhaler therapy. Followup in October as scheduled.

## 2022-12-07 ENCOUNTER — APPOINTMENT (OUTPATIENT)
Dept: PULMONOLOGY | Facility: CLINIC | Age: 67
End: 2022-12-07

## 2022-12-07 VITALS
TEMPERATURE: 98.1 F | HEART RATE: 93 BPM | RESPIRATION RATE: 16 BRPM | OXYGEN SATURATION: 96 % | WEIGHT: 235 LBS | HEIGHT: 73 IN | SYSTOLIC BLOOD PRESSURE: 144 MMHG | DIASTOLIC BLOOD PRESSURE: 82 MMHG | BODY MASS INDEX: 31.14 KG/M2

## 2022-12-07 VITALS — SYSTOLIC BLOOD PRESSURE: 135 MMHG | DIASTOLIC BLOOD PRESSURE: 78 MMHG

## 2022-12-07 DIAGNOSIS — J20.9 ACUTE BRONCHITIS, UNSPECIFIED: ICD-10-CM

## 2022-12-07 LAB
FLUAV SPEC QL CULT: NORMAL
FLUBV AG SPEC QL IA: NORMAL
SARS-COV-2 RNA CT RESP QN NAA+PROBE: NORMAL

## 2022-12-07 PROCEDURE — 87426 SARSCOV CORONAVIRUS AG IA: CPT | Mod: QW

## 2022-12-07 PROCEDURE — 99213 OFFICE O/P EST LOW 20 MIN: CPT | Mod: 25

## 2022-12-07 PROCEDURE — 87804 INFLUENZA ASSAY W/OPTIC: CPT | Mod: QW

## 2022-12-07 NOTE — ASSESSMENT
[FreeTextEntry1] : This is 68 y/o male p/w persistent cough, wheeze following resolution of URI symptoms. On PE, large PND, lungs with rhonchi which cleared with cough, slight expiratory wheeze RUL and RML. Sx likely 2/2 bronchitis, given patient continues to smoke will manage with oral steroids and abx therapy. Advised to follow up persistent/worsening concerns. \par \par -START: Prednisone  40 mg daily x 6 days. take with food. counseled patient on medication use/s/e/a/r. \par \par -START: Doxycycline 100 mg bid x 10 days. take with food. counseled patient on medication use/s/e/a/r.\par \par -START: Benzonatate 200 mg tid PRN cough. counseled patient on medication use/s/e/a/r.\par New medication; notify our office if any s/s side effects or adverse reaction. for serious/potentially life threatening reactions- go to nearest emergency department and then notify our office.\par  \par -CONTINUE: Flonase daily. \par \par -OTC Mucinex as directed for chest congestion\par \par -Conservative measures: immune support-vitc, mtv, zinc. plenty of fluids, rest and eat well balanced. \par \par -QUIT SMOKING\par \par -Advised patient to RTO for pneumococcal vaccine pending resolution of acute concerns. \par \par patient will o/w continue medications as prescribed, and continue f/u with specialists & PCP Dr Matthew 4/26/2023.\par \par notify office if worsening/persistent symptoms or new onset complaints/concerns, in the event of any emergency or life threatening condition, go to the nearest emergency department and then notify office.\par patient verbalized understanding and agrees with plan of care.

## 2022-12-07 NOTE — HISTORY OF PRESENT ILLNESS
[FreeTextEntry8] : this is a 68yo patient of Dr. Matthew, here for acute concern non-productive cough, fever, & chest tightness/ wheeze with some relief with use of Albuterol. \par \par reports temp max 101 maybe 3 days ago, last known fever was this last weekend 101f oral. \par he started with URI sx 1 week ago which has since resolved- cough is primary concern today. \par \par multiple sick contacts- all his grandkids are sick.\par \par immunized for cov19 no booster. \par \par significant pulm. hx includes asthma, current smoker, he takes Ventolin prn., no maintenance therapy. \par \par OTC mgmt. "some cold remedy" u/k name, denies noticeable improvement. \par \par patient denies CP/ SOB, diarrhea, N/V, rash, pleurisy, or hemoptysis. \par \par he is in no acute distress, will evaluate and manage as appropriate.

## 2022-12-07 NOTE — PHYSICAL EXAM
[No Acute Distress] : no acute distress [Normal Sclera/Conjunctiva] : normal sclera/conjunctiva [PERRL] : pupils equal round and reactive to light [Normal] : normal rate, regular rhythm, normal S1 and S2 and no murmur heard [No Edema] : there was no peripheral edema [Soft] : abdomen soft [Non Tender] : non-tender [Non-distended] : non-distended [Normal Bowel Sounds] : normal bowel sounds [Normal Posterior Cervical Nodes] : no posterior cervical lymphadenopathy [Normal Anterior Cervical Nodes] : no anterior cervical lymphadenopathy [No Rash] : no rash [Coordination Grossly Intact] : coordination grossly intact [Normal Gait] : normal gait [Normal Affect] : the affect was normal [Normal Insight/Judgement] : insight and judgment were intact [de-identified] : mildly ill appearing [de-identified] : large post nasal drip [de-identified] : diffuse rhonchi cleared with cough, slight exp. wheeze RUL & RML, o/w clear.

## 2022-12-07 NOTE — REVIEW OF SYSTEMS
[Fatigue] : fatigue [Postnasal Drip] : postnasal drip [Wheezing] : wheezing [Cough] : cough [Negative] : Heme/Lymph [Fever] : no fever [Chills] : no chills [Hot Flashes] : no hot flashes [Night Sweats] : no night sweats [Earache] : no earache [Hearing Loss] : no hearing loss [Nosebleeds] : no nosebleeds [Nasal Discharge] : no nasal discharge [Sore Throat] : no sore throat [Hoarseness] : no hoarseness [Shortness Of Breath] : no shortness of breath [Dyspnea on Exertion] : not dyspnea on exertion [Joint Pain] : no joint pain [Back Pain] : no back pain [Skin Rash] : no skin rash

## 2022-12-07 NOTE — COUNSELING
[Fall prevention counseling provided] : Fall prevention counseling provided [Behavioral health counseling provided] : Behavioral health counseling provided [Sleep ___ hours/day] : Sleep [unfilled] hours/day [Engage in a relaxing activity] : Engage in a relaxing activity [Plan in advance] : Plan in advance [None] : None [Good understanding] : Patient has a good understanding of lifestyle changes and steps needed to achieve self management goal [Yes] : Willing to quit smoking [FreeTextEntry2] : working on cutting down, refuses further intervention at this time [de-identified] : URI;\par take medications as prescribed/directed\par use of OTC probiotic recommended.\par Recommend supportive care including antipyretics, fluids, and nasal saline rinse/wash.\par notify/return to office if worsening/persistent symptoms or new onset complaints/concerns, in the event of any emergency or life threatening condition, go to the nearest emergency department and then notify office. \par patient verbalized understanding and agrees with plan of care.

## 2023-03-07 ENCOUNTER — RX RENEWAL (OUTPATIENT)
Age: 68
End: 2023-03-07

## 2023-07-21 ENCOUNTER — NON-APPOINTMENT (OUTPATIENT)
Age: 68
End: 2023-07-21

## 2023-07-21 ENCOUNTER — APPOINTMENT (OUTPATIENT)
Dept: INTERNAL MEDICINE | Facility: CLINIC | Age: 68
End: 2023-07-21
Payer: MEDICARE

## 2023-07-21 VITALS
HEART RATE: 87 BPM | HEIGHT: 73 IN | TEMPERATURE: 98.2 F | OXYGEN SATURATION: 96 % | BODY MASS INDEX: 28.49 KG/M2 | SYSTOLIC BLOOD PRESSURE: 128 MMHG | WEIGHT: 215 LBS | DIASTOLIC BLOOD PRESSURE: 74 MMHG

## 2023-07-21 DIAGNOSIS — E66.9 OBESITY, UNSPECIFIED: ICD-10-CM

## 2023-07-21 PROCEDURE — 99214 OFFICE O/P EST MOD 30 MIN: CPT

## 2023-07-21 RX ORDER — NEOMYCIN SULFATE, POLYMYXIN B SULFATE AND DEXAMETHASONE 3.5; 10000; 1 MG/ML; [USP'U]/ML; MG/ML
3.5-10000-0.1 SUSPENSION OPHTHALMIC
Qty: 5 | Refills: 0 | Status: DISCONTINUED | COMMUNITY
Start: 2022-06-23 | End: 2023-07-21

## 2023-07-21 RX ORDER — PREDNISONE 20 MG/1
20 TABLET ORAL DAILY
Qty: 12 | Refills: 0 | Status: DISCONTINUED | COMMUNITY
Start: 2022-12-07 | End: 2023-07-21

## 2023-07-21 RX ORDER — DOXYCYCLINE 100 MG/1
100 CAPSULE ORAL TWICE DAILY
Qty: 20 | Refills: 0 | Status: DISCONTINUED | COMMUNITY
Start: 2022-12-07 | End: 2023-07-21

## 2023-07-21 RX ORDER — LOSARTAN POTASSIUM 25 MG/1
25 TABLET, FILM COATED ORAL
Qty: 1 | Refills: 1 | Status: DISCONTINUED | COMMUNITY
Start: 2022-02-28 | End: 2023-07-21

## 2023-07-21 RX ORDER — BENZONATATE 200 MG/1
200 CAPSULE ORAL 3 TIMES DAILY
Qty: 45 | Refills: 3 | Status: DISCONTINUED | COMMUNITY
Start: 2022-12-07 | End: 2023-07-21

## 2023-07-21 NOTE — PLAN
[FreeTextEntry1] : Tomás presents for a follow-up evaluation.\par \par 1.  Patient will continue on current medication regimen which has been reviewed and revised.\par \par 2.  Tomás continues to smoke almost 1 pack of cigarettes per day.  He will be starting a smoking cessation program through the [facility] starting on 7/24.\par \par 3.  Prescription for CBC, CMP, hemoglobin A1c, iron level, lipid profile, PSA level, TSH and urinalysis.\par \par 4.  Patient will continue on current diet.  He has lost approximately 25 pounds.\par \par 5.  Follow-up as scheduled

## 2023-07-21 NOTE — HISTORY OF PRESENT ILLNESS
[FreeTextEntry1] : Follow-up evaluation [de-identified] : Tomás presents for a follow-up evaluation.  He continues to smoke almost 1 pack of cigarettes per day.  He gets some shortness of breath with exertion.  He continues on Flovent 110 mcg 2 puffs daily.  He has an albuterol metered-dose inhaler for rescue therapy.  He is also on losartan hydrochlorothiazide 50/12.5 mg daily.  Tomás is also followed at [facility] by the [facility].  He recently had a yearly CT scan of the chest.  He has lost approximately 25 pounds in the past 6 months.

## 2023-08-22 ENCOUNTER — RX RENEWAL (OUTPATIENT)
Age: 68
End: 2023-08-22

## 2023-08-23 RX ORDER — LOSARTAN POTASSIUM AND HYDROCHLOROTHIAZIDE 12.5; 5 MG/1; MG/1
50-12.5 TABLET ORAL
Qty: 90 | Refills: 1 | Status: ACTIVE | COMMUNITY
Start: 2022-04-28 | End: 1900-01-01

## 2023-10-03 NOTE — CONSULT NOTE ADULT - PROBLEM SELECTOR PROBLEM 1
Detail Level: Detailed
Sepsis, due to unspecified organism, unspecified whether acute organ dysfunction present
Detail Level: Zone

## 2024-03-12 ENCOUNTER — APPOINTMENT (OUTPATIENT)
Dept: INTERNAL MEDICINE | Facility: CLINIC | Age: 69
End: 2024-03-12
Payer: MEDICARE

## 2024-03-12 VITALS
HEIGHT: 73 IN | OXYGEN SATURATION: 91 % | TEMPERATURE: 98.2 F | SYSTOLIC BLOOD PRESSURE: 148 MMHG | DIASTOLIC BLOOD PRESSURE: 88 MMHG | RESPIRATION RATE: 16 BRPM | HEART RATE: 113 BPM | BODY MASS INDEX: 31.41 KG/M2 | WEIGHT: 237 LBS

## 2024-03-12 DIAGNOSIS — E78.5 HYPERLIPIDEMIA, UNSPECIFIED: ICD-10-CM

## 2024-03-12 DIAGNOSIS — R05.9 COUGH, UNSPECIFIED: ICD-10-CM

## 2024-03-12 DIAGNOSIS — F17.200 NICOTINE DEPENDENCE, UNSPECIFIED, UNCOMPLICATED: ICD-10-CM

## 2024-03-12 DIAGNOSIS — I10 ESSENTIAL (PRIMARY) HYPERTENSION: ICD-10-CM

## 2024-03-12 DIAGNOSIS — J45.909 UNSPECIFIED ASTHMA, UNCOMPLICATED: ICD-10-CM

## 2024-03-12 PROCEDURE — ZZZZZ: CPT

## 2024-03-12 PROCEDURE — 99213 OFFICE O/P EST LOW 20 MIN: CPT | Mod: 25

## 2024-03-12 PROCEDURE — 94727 GAS DIL/WSHOT DETER LNG VOL: CPT

## 2024-03-12 PROCEDURE — 94729 DIFFUSING CAPACITY: CPT

## 2024-03-12 PROCEDURE — 94060 EVALUATION OF WHEEZING: CPT

## 2024-03-12 RX ORDER — LEVOFLOXACIN 500 MG/1
500 TABLET, FILM COATED ORAL DAILY
Qty: 7 | Refills: 1 | Status: ACTIVE | COMMUNITY
Start: 2024-03-12 | End: 1900-01-01

## 2024-03-12 NOTE — DATA REVIEWED
[FreeTextEntry1] : Complete pulmonary function test were performed. FVC 3.34 L which is 70% predicted.  FEV1 2.58 L which is 72% predicted.  FEV1/FVC ratio 77%.  FEF 25/75% 2.31 L/s which is 85% predicted.  PEF 6.14 L/s which is 66% predicted.  There is no significant bronchodilator response.  Lung volumes and diffusing capacity within normal limits.

## 2024-03-12 NOTE — PLAN
[FreeTextEntry1] : Mr. Ronquillo presents for an acute evaluation.  He has symptoms acute bronchitis.  He will be put on Levaquin 500 milligrams daily x 7 days.  He will continue Flovent metered-dose inhaler therapy.  Patient has been given a prescription for comprehensive lab profile.  He will follow-up in 6 months for complete physical examination.

## 2024-03-12 NOTE — HISTORY OF PRESENT ILLNESS
[FreeTextEntry1] : Follow-up [de-identified] : Mr. Ronquillo presents for acute evaluation.  He is complaining a cough for the past 7 to 10 days.  The cough is occasionally productive thick phlegm.  He is also had low-grade fevers although he has not measured them.  There is no hemoptysis.  Mr. Ronquillo also has significant head congestion.  He continues on Flovent 110 mcg 4 asthma.

## 2024-04-05 ENCOUNTER — APPOINTMENT (OUTPATIENT)
Dept: DERMATOLOGY | Facility: CLINIC | Age: 69
End: 2024-04-05

## 2024-04-29 LAB
ALBUMIN SERPL ELPH-MCNC: 4.5 G/DL
ALP BLD-CCNC: 73 U/L
ALT SERPL-CCNC: 17 U/L
ANION GAP SERPL CALC-SCNC: 12 MMOL/L
APPEARANCE: CLEAR
AST SERPL-CCNC: 14 U/L
BACTERIA: NEGATIVE /HPF
BILIRUB SERPL-MCNC: 0.7 MG/DL
BILIRUBIN URINE: NEGATIVE
BLOOD URINE: NEGATIVE
BUN SERPL-MCNC: 15 MG/DL
CALCIUM SERPL-MCNC: 9 MG/DL
CAST: 0 /LPF
CHLORIDE SERPL-SCNC: 103 MMOL/L
CHOLEST SERPL-MCNC: 183 MG/DL
CO2 SERPL-SCNC: 26 MMOL/L
COLOR: YELLOW
CREAT SERPL-MCNC: 0.83 MG/DL
EGFR: 95 ML/MIN/1.73M2
EPITHELIAL CELLS: 0 /HPF
ESTIMATED AVERAGE GLUCOSE: 111 MG/DL
GLUCOSE QUALITATIVE U: NEGATIVE MG/DL
GLUCOSE SERPL-MCNC: 103 MG/DL
HBA1C MFR BLD HPLC: 5.5 %
HCT VFR BLD CALC: 36.9 %
HDLC SERPL-MCNC: 41 MG/DL
HGB BLD-MCNC: 11.5 G/DL
IRON SERPL-MCNC: 89 UG/DL
KETONES URINE: NEGATIVE MG/DL
LDLC SERPL CALC-MCNC: 119 MG/DL
LEUKOCYTE ESTERASE URINE: NEGATIVE
MCHC RBC-ENTMCNC: 21.9 PG
MCHC RBC-ENTMCNC: 31.2 GM/DL
MCV RBC AUTO: 70.3 FL
MICROSCOPIC-UA: NORMAL
NITRITE URINE: NEGATIVE
NONHDLC SERPL-MCNC: 142 MG/DL
PH URINE: 6
PLATELET # BLD AUTO: 213 K/UL
POTASSIUM SERPL-SCNC: 4 MMOL/L
PROT SERPL-MCNC: 7 G/DL
PROTEIN URINE: NEGATIVE MG/DL
PSA SERPL-MCNC: 0.41 NG/ML
RBC # BLD: 5.25 M/UL
RBC # FLD: 15.8 %
RED BLOOD CELLS URINE: 3 /HPF
SODIUM SERPL-SCNC: 141 MMOL/L
SPECIFIC GRAVITY URINE: 1.02
TRIGL SERPL-MCNC: 129 MG/DL
TSH SERPL-ACNC: 2.71 UIU/ML
UROBILINOGEN URINE: 0.2 MG/DL
WBC # FLD AUTO: 7.86 K/UL
WHITE BLOOD CELLS URINE: 0 /HPF

## 2024-05-02 ENCOUNTER — NON-APPOINTMENT (OUTPATIENT)
Age: 69
End: 2024-05-02

## 2024-05-02 DIAGNOSIS — Z79.899 OTHER LONG TERM (CURRENT) DRUG THERAPY: ICD-10-CM

## 2024-06-06 NOTE — ED STATDOCS - GASTROINTESTINAL [+], MLM
Roomed by Whitney Govea CMA  6/6/2024  10:21 AM  Name and date of birth verified.    VOMITING/NAUSEA

## 2024-06-23 ENCOUNTER — RX RENEWAL (OUTPATIENT)
Age: 69
End: 2024-06-23

## 2024-06-23 RX ORDER — ROSUVASTATIN CALCIUM 5 MG/1
5 TABLET, FILM COATED ORAL DAILY
Qty: 30 | Refills: 5 | Status: ACTIVE | COMMUNITY
Start: 2024-05-02 | End: 1900-01-01

## 2024-12-17 ENCOUNTER — APPOINTMENT (OUTPATIENT)
Dept: DERMATOLOGY | Facility: CLINIC | Age: 69
End: 2024-12-17
Payer: MEDICARE

## 2024-12-17 DIAGNOSIS — L82.0 INFLAMED SEBORRHEIC KERATOSIS: ICD-10-CM

## 2024-12-17 DIAGNOSIS — L81.4 OTHER MELANIN HYPERPIGMENTATION: ICD-10-CM

## 2024-12-17 DIAGNOSIS — D22.9 MELANOCYTIC NEVI, UNSPECIFIED: ICD-10-CM

## 2024-12-17 PROCEDURE — 99213 OFFICE O/P EST LOW 20 MIN: CPT | Mod: 25

## 2024-12-17 PROCEDURE — 17110 DESTRUCTION B9 LES UP TO 14: CPT

## 2025-01-23 ENCOUNTER — RX RENEWAL (OUTPATIENT)
Age: 70
End: 2025-01-23

## 2025-03-06 ENCOUNTER — APPOINTMENT (OUTPATIENT)
Dept: DERMATOLOGY | Facility: CLINIC | Age: 70
End: 2025-03-06
Payer: MEDICARE

## 2025-03-06 DIAGNOSIS — L81.8 OTHER SPECIFIED DISORDERS OF PIGMENTATION: ICD-10-CM

## 2025-03-06 DIAGNOSIS — D23.9 OTHER BENIGN NEOPLASM OF SKIN, UNSPECIFIED: ICD-10-CM

## 2025-03-06 PROCEDURE — 99213 OFFICE O/P EST LOW 20 MIN: CPT

## 2025-05-13 ENCOUNTER — APPOINTMENT (OUTPATIENT)
Dept: INTERNAL MEDICINE | Facility: CLINIC | Age: 70
End: 2025-05-13
Payer: MEDICARE

## 2025-05-13 VITALS
HEART RATE: 76 BPM | SYSTOLIC BLOOD PRESSURE: 120 MMHG | RESPIRATION RATE: 15 BRPM | TEMPERATURE: 98 F | HEIGHT: 73 IN | DIASTOLIC BLOOD PRESSURE: 80 MMHG | BODY MASS INDEX: 32.74 KG/M2 | WEIGHT: 247 LBS | OXYGEN SATURATION: 97 %

## 2025-05-13 DIAGNOSIS — Z87.891 PERSONAL HISTORY OF NICOTINE DEPENDENCE: ICD-10-CM

## 2025-05-13 DIAGNOSIS — D56.3 THALASSEMIA MINOR: ICD-10-CM

## 2025-05-13 DIAGNOSIS — N20.0 CALCULUS OF KIDNEY: ICD-10-CM

## 2025-05-13 DIAGNOSIS — I10 ESSENTIAL (PRIMARY) HYPERTENSION: ICD-10-CM

## 2025-05-13 DIAGNOSIS — E78.5 HYPERLIPIDEMIA, UNSPECIFIED: ICD-10-CM

## 2025-05-13 DIAGNOSIS — Z87.39 PERSONAL HISTORY OF OTHER DISEASES OF THE MUSCULOSKELETAL SYSTEM AND CONNECTIVE TISSUE: ICD-10-CM

## 2025-05-13 DIAGNOSIS — S13.4XXS SPRAIN OF LIGAMENTS OF CERVICAL SPINE, SEQUELA: ICD-10-CM

## 2025-05-13 DIAGNOSIS — J45.909 UNSPECIFIED ASTHMA, UNCOMPLICATED: ICD-10-CM

## 2025-05-13 DIAGNOSIS — R06.09 OTHER FORMS OF DYSPNEA: ICD-10-CM

## 2025-05-13 DIAGNOSIS — M54.16 RADICULOPATHY, LUMBAR REGION: ICD-10-CM

## 2025-05-13 DIAGNOSIS — L98.9 DISORDER OF THE SKIN AND SUBCUTANEOUS TISSUE, UNSPECIFIED: ICD-10-CM

## 2025-05-13 DIAGNOSIS — Z86.018 PERSONAL HISTORY OF OTHER BENIGN NEOPLASM: ICD-10-CM

## 2025-05-13 DIAGNOSIS — F17.200 NICOTINE DEPENDENCE, UNSPECIFIED, UNCOMPLICATED: ICD-10-CM

## 2025-05-13 DIAGNOSIS — L82.0 INFLAMED SEBORRHEIC KERATOSIS: ICD-10-CM

## 2025-05-13 DIAGNOSIS — Z86.03 PERSONAL HISTORY OF NEOPLASM OF UNCERTAIN BEHAVIOR: ICD-10-CM

## 2025-05-13 DIAGNOSIS — H26.9 UNSPECIFIED CATARACT: ICD-10-CM

## 2025-05-13 DIAGNOSIS — E66.811 OBESITY, CLASS 1: ICD-10-CM

## 2025-05-13 DIAGNOSIS — N52.9 MALE ERECTILE DYSFUNCTION, UNSPECIFIED: ICD-10-CM

## 2025-05-13 DIAGNOSIS — E66.3 OVERWEIGHT: ICD-10-CM

## 2025-05-13 DIAGNOSIS — Z79.899 OTHER LONG TERM (CURRENT) DRUG THERAPY: ICD-10-CM

## 2025-05-13 DIAGNOSIS — M54.2 CERVICALGIA: ICD-10-CM

## 2025-05-13 DIAGNOSIS — G89.29 CERVICALGIA: ICD-10-CM

## 2025-05-13 DIAGNOSIS — Z87.2 PERSONAL HISTORY OF DISEASES OF THE SKIN AND SUBCUTANEOUS TISSUE: ICD-10-CM

## 2025-05-13 DIAGNOSIS — L98.8 OTHER SPECIFIED DISORDERS OF THE SKIN AND SUBCUTANEOUS TISSUE: ICD-10-CM

## 2025-05-13 PROCEDURE — G2211 COMPLEX E/M VISIT ADD ON: CPT

## 2025-05-13 PROCEDURE — 99214 OFFICE O/P EST MOD 30 MIN: CPT

## 2025-05-13 RX ORDER — SILDENAFIL 50 MG/1
50 TABLET ORAL
Qty: 6 | Refills: 5 | Status: ACTIVE | COMMUNITY
Start: 2025-05-13 | End: 1900-01-01

## 2025-05-30 NOTE — ED ADULT TRIAGE NOTE - ESI TRIAGE ACUITY LEVEL, MLM
Hpi Title: Evaluation of Skin Lesions
Additional History: No spots of concern\\n\\nSpf30+, reapplies, hats. Sunglasses
3

## 2025-06-09 ENCOUNTER — APPOINTMENT (OUTPATIENT)
Dept: INTERNAL MEDICINE | Facility: CLINIC | Age: 70
End: 2025-06-09

## 2025-06-11 PROBLEM — R79.89 ELEVATED LFTS: Status: ACTIVE | Noted: 2025-06-11

## 2025-06-11 RX ORDER — ROSUVASTATIN CALCIUM 5 MG/1
5 TABLET, FILM COATED ORAL
Qty: 30 | Refills: 5 | Status: DISCONTINUED | COMMUNITY
Start: 2025-06-09 | End: 2025-06-11

## 2025-07-14 ENCOUNTER — APPOINTMENT (OUTPATIENT)
Dept: ULTRASOUND IMAGING | Facility: CLINIC | Age: 70
End: 2025-07-14
Payer: MEDICARE

## 2025-07-14 ENCOUNTER — LABORATORY RESULT (OUTPATIENT)
Age: 70
End: 2025-07-14

## 2025-07-14 ENCOUNTER — OUTPATIENT (OUTPATIENT)
Dept: OUTPATIENT SERVICES | Facility: HOSPITAL | Age: 70
LOS: 1 days | End: 2025-07-14
Payer: MEDICARE

## 2025-07-14 DIAGNOSIS — R79.89 OTHER SPECIFIED ABNORMAL FINDINGS OF BLOOD CHEMISTRY: ICD-10-CM

## 2025-07-14 DIAGNOSIS — Z93.3 COLOSTOMY STATUS: Chronic | ICD-10-CM

## 2025-07-14 PROCEDURE — 76700 US EXAM ABDOM COMPLETE: CPT

## 2025-07-14 PROCEDURE — 76700 US EXAM ABDOM COMPLETE: CPT | Mod: 26

## 2025-07-17 LAB
HAV IGM SER QL: NONREACTIVE
HBV CORE IGM SER QL: NONREACTIVE
HBV SURFACE AG SER QL: NONREACTIVE
HCV AB SER QL: NONREACTIVE
HCV S/CO RATIO: 0.16 S/CO

## 2025-08-01 LAB
ANION GAP SERPL CALC-SCNC: 14 MMOL/L
BUN SERPL-MCNC: 19 MG/DL
CALCIUM SERPL-MCNC: 9.1 MG/DL
CHLORIDE SERPL-SCNC: 102 MMOL/L
CO2 SERPL-SCNC: 27 MMOL/L
CREAT SERPL-MCNC: 0.89 MG/DL
EGFRCR SERPLBLD CKD-EPI 2021: 93 ML/MIN/1.73M2
GLUCOSE SERPL-MCNC: 124 MG/DL
POTASSIUM SERPL-SCNC: 4.1 MMOL/L
SODIUM SERPL-SCNC: 143 MMOL/L

## 2025-09-08 ENCOUNTER — LABORATORY RESULT (OUTPATIENT)
Age: 70
End: 2025-09-08